# Patient Record
Sex: FEMALE | Race: WHITE | Employment: FULL TIME | URBAN - METROPOLITAN AREA
[De-identification: names, ages, dates, MRNs, and addresses within clinical notes are randomized per-mention and may not be internally consistent; named-entity substitution may affect disease eponyms.]

---

## 2020-05-26 ENCOUNTER — OFFICE VISIT (OUTPATIENT)
Dept: OBGYN CLINIC | Age: 25
End: 2020-05-26

## 2020-05-26 VITALS
WEIGHT: 122 LBS | DIASTOLIC BLOOD PRESSURE: 68 MMHG | BODY MASS INDEX: 19.61 KG/M2 | HEIGHT: 66 IN | SYSTOLIC BLOOD PRESSURE: 116 MMHG

## 2020-05-26 DIAGNOSIS — Z34.80 SUPERVISION OF OTHER NORMAL PREGNANCY, ANTEPARTUM: Primary | ICD-10-CM

## 2020-05-26 LAB
ANTIBODY SCREEN, EXTERNAL: NEGATIVE
CHLAMYDIA, EXTERNAL: NEGATIVE
HBSAG, EXTERNAL: NEGATIVE
HCT, EXTERNAL: 37.2
HGB, EXTERNAL: 12.4
HIV, EXTERNAL: NEGATIVE
N. GONORRHEA, EXTERNAL: NEGATIVE
PLATELET CNT,   EXTERNAL: 251
RUBELLA, EXTERNAL: NORMAL
T. PALLIDUM, EXTERNAL: NEGATIVE
TYPE, ABO & RH, EXTERNAL: NORMAL
VARICELLA, EXTERNAL: NORMAL

## 2020-05-26 NOTE — PROGRESS NOTES
Current pregnancy history:    Varun Edwards is a 26 y.o. female who presents for the evaluation of pregnancy. Patient's last menstrual period was 03/17/2020 (exact date). LMP history:  The date of her LMP is  certain. Her last menstrual period was normal and lasted for 4 to 5 days. A urine pregnancy test was positive 4/23/20 and 5/26/20. She was not on the pill at conception. Based on her LMP, her EDC is 12/22/20 and her EGA is 10 weeks,0 days. Her menstrual cycles are regular and occur approximately every 28 days  and range from 3 to 5 days. The last menses lasted  the usual number of days. Ultrasound data:  She had an  ultrasound done by the ultrasound tech [facility] which revealed a viable dunn pregnancy with a gestational age of 9 weeks and 0 days giving an [facility] of 12/22/20. TA ULTRASOUND PERFORMED  A SINGLE VIABLE 10W0D IUP IS SEEN WITH NORMAL CARDIAC RHYTHM. GESTATIONAL AGE BASED ON [facility] ULTRASOUND. A NORMAL YOLK SAC IS SEEN. RIGHT OVARY APPEARS WITHIN NORMAL LIMITS. LEFT OVARY APPEARS WITHIN NORMAL LIMITS. NO FREE FLUID SEEN IN THE CDS. Pregnancy symptoms:    Since her LMP she has experienced  urinary frequency, breast tenderness, and nausea. She has not been vomiting over the last few weeks. Associated signs and symptoms which she denies: dysuria, discharge, vaginal bleeding. She states she has gained weight:  Approximately 5 pounds over the last few weeks. Relevant past pregnancy history:   She has the following pregnancy history:    SAB X2    Relevant past medical history:(relevant to this pregnancy): noncontributory. Pap/Occupational history:  Last pap smear: 8/2019, per patient Results: Normal, per patient     Her occupation is: Teacher, [address] Substance history: negative for alcohol, tobacco and street drugs. Positive for nothing. Exposure history: There are no indoor cat/s in the home.    She admits close contact with children on a regular basis. She has had Varicella vaccine in the past.   Patient denies issues with domestic violence. Genetic Screening/Teratology Counseling: (Includes patient, baby's father, or anyone in either family with:)  3.  Patient's age >/= 28 at [facility]?-- no  .   2. Thalassemia ([facility], [place], [address], or  background): MCV<80?--no.     3.  Neural tube defect (meningomyelocele, spina bifida, anencephaly)?--no.   4.  Congenital heart defect?--no.  5.  Down syndrome?--no.   6.  Adrian-Sachs (Zoroastrianism, [place])?--no.   7.  Canavan's Disease?--no.   8.  Familial Dysautonomia?--no.   9.  Sickle cell disease or trait ()? --no   The patient has not been tested for sickle trait  10. Hemophilia or other blood disorders?--no. 11.  Muscular dystrophy?--no. 12.  Cystic fibrosis?--no. 13.  Cascade's Chorea?--no. 14.  Mental retardation/autism (if yes was person tested for Fragile X)?--no. 15.  Other inherited genetic or chromosomal disorder?--no. 12.  Maternal metabolic disorder (DM, PKU, etc)?--no. 17.  Patient or FOB with a child with a birth defect not listed above?--no.  17a. Patient or FOB with a birth defect themselves?--no. 18.  Recurrent pregnancy loss, or stillbirth?--no. 19.  Any medications since LMP other than prenatal vitamins (include vitamins,  supplements, OTC meds, drugs, alcohol)?--no. 20.  Any other genetic/environmental exposure to discuss?--no. Infection History:  1. Lives with someone with TB or TB exposed?--no.   2.  Patient or partner has history of genital herpes?--no.  3.  Rash or viral illness since LMP?--no.    4.  History of STD (GC, CT, HPV, syphilis, HIV)? --no   5.  Other: OTHER?      OB History    Para Term  AB Living   3 0 0 0 2 0   SAB TAB Ectopic Molar Multiple Live Births   2 0 0 0 0 0      # Outcome Date GA Lbr Salbador/2nd Weight Sex Delivery Anes PTL Lv   3 Current            2 2020           1 2020 Past Medical History:   Diagnosis Date    Migraines      History reviewed. No pertinent surgical history. Social History     Occupational History    Not on file   Tobacco Use    Smoking status: Never Smoker    Smokeless tobacco: Never Used   Substance and Sexual Activity    Alcohol use: Not on file    Drug use: Not on file    Sexual activity: Yes     Partners: Male     No family history on file.     Allergies not on file  Prior to Admission medications    Not on File        Review of Systems: History obtained from the patient  Constitutional: negative for weight loss, fever, night sweats  HEENT: negative for hearing loss, earache, congestion, snoring, sorethroat  CV: negative for chest pain, palpitations, edema  Resp: negative for cough, shortness of breath, wheezing  Breast: negative for breast lumps, nipple discharge, galactorrhea  GI: negative for change in bowel habits, abdominal pain, black or bloody stools  : negative for frequency, dysuria, hematuria, vaginal discharge  MSK: negative for back pain, joint pain, muscle pain  Skin: negative for itching, rash, hives  Neuro: negative for dizziness, headache, confusion, weakness  Psych: negative for anxiety, depression, change in mood  Heme/lymph: negative for bleeding, bruising, pallor    Objective:  Visit Vitals  /68   Ht 5' 5.5\" (1.664 m)   Wt 122 lb (55.3 kg)   LMP 03/17/2020 (Exact Date)   BMI 19.99 kg/m²       Physical Exam:   PHYSICAL EXAMINATION    Constitutional  · Appearance: well-nourished, well developed, alert, in no acute distress    HENT  · Head  · Face: appears normal  · Eyes: appear normal  · Ears: normal  · Mouth: normal  · Lips: no lesions    Neck  · Inspection/Palpation: normal appearance, no masses or tenderness  · Lymph Nodes: no lymphadenopathy present  · Thyroid: gland size normal, nontender, no nodules or masses present on palpation    Chest  · Respiratory Effort: breathing unlabored  · Auscultation: normal breath sounds    Cardiovascular  · Heart:  · Auscultation: regular rate and rhythm without murmur    Breasts  · Inspection of Breasts: breasts symmetrical, no skin changes, no discharge present, nipple appearance normal, no skin retraction present  · Palpation of Breasts and Axillae: no masses present on palpation, no breast tenderness  · Axillary Lymph Nodes: no lymphadenopathy present    Gastrointestinal  · Abdominal Examination: abdomen non-tender to palpation, normal bowel sounds, no masses present  · Liver and spleen: no hepatomegaly present, spleen not palpable  · Hernias: no hernias identified    Genitourinary  · External Genitalia: normal appearance for age, no discharge present, no tenderness present, no inflammatory lesions present, no masses present, no atrophy present  · Vagina: normal vaginal vault without central or paravaginal defects, no discharge present, no inflammatory lesions present, no masses present  · Bladder: non-tender to palpation  · Urethra: appears normal  · Cervix: normal   · Uterus: enlarged, normal shape, soft  · Adnexa: no adnexal tenderness present, no adnexal masses present  · Perineum: perineum within normal limits, no evidence of trauma, no rashes or skin lesions present  · Anus: anus within normal limits, no hemorrhoids present  · Inguinal Lymph Nodes: no lymphadenopathy present    Skin  · General Inspection: no rash, no lesions identified    Neurologic/Psychiatric  · Mental Status:  · Orientation: grossly oriented to person, place and time  · Mood and Affect: mood normal, affect appropriate    Assessment:   Intrauterine pregnancy with the following problems identified:   EDC 12/22/2020 by D=  NIPTS  Horizon  Parvo        Plan:     Offered CF testing, CVS, Nuchal Translucency, MSAFP, amnio, and discussed NIPT  Course of pregnancy discussed including visit schedule, routine U/S, glucola testing, etc.  Avoid alcoholic beverages and illicit/recreational drugs use  Take prenatal vitamins or folic acid daily. Hospital and practice style discussed with coverage system. Discussed nutrition, toxoplasmosis precautions, sexual activity, exercise, need for influenza vaccine, environmental and work hazards, travel advice, screen for domestic violence, need for seat belts. Discussed seafood, unpasteurized dairy products, deli meat, artificial sweeteners, and caffeine. Information on prenatal classes/breastfeeding given. Information on circumcision given  Patient encouraged not to smoke. Discussed current prescription drug use. Given medication list.  Discussed the use of over the counter medications and chemicals. Route of delivery discussed, including risks, benefits, and alternatives of  versus repeat LTCS. Pt understands risk of hemorrhage during pregnancy and post delivery and would accept blood products if necessary in life-threatening emergencies  Fu 2 weeks    Handouts given to pt.

## 2020-05-27 ENCOUNTER — TELEPHONE (OUTPATIENT)
Dept: OBGYN CLINIC | Age: 25
End: 2020-05-27

## 2020-05-27 NOTE — TELEPHONE ENCOUNTER
Call received at 635am    26year old patient last seen in the office yesterday for pregnancy. Patient denies vaginal bleeding and cramping. Patient calling to say that is is ok to sent the blood work for the NIPTS testing.     Thank you

## 2020-05-28 DIAGNOSIS — Z34.81 ENCOUNTER FOR SUPERVISION OF OTHER NORMAL PREGNANCY, FIRST TRIMESTER: ICD-10-CM

## 2020-05-28 LAB
ABO GROUP BLD: NORMAL
B19V IGG SER IA-ACNC: 6.1 INDEX (ref 0–0.8)
B19V IGM SER IA-ACNC: 0 INDEX (ref 0–0.8)
BLD GP AB SCN SERPL QL: NEGATIVE
C TRACH RRNA SPEC QL NAA+PROBE: NEGATIVE
CYTOLOGIST CVX/VAG CYTO: NORMAL
CYTOLOGY CVX/VAG DOC CYTO: NORMAL
CYTOLOGY CVX/VAG DOC THIN PREP: NORMAL
DX ICD CODE: NORMAL
ERYTHROCYTE [DISTWIDTH] IN BLOOD BY AUTOMATED COUNT: 12.5 % (ref 11.7–15.4)
HBV SURFACE AG SERPL QL IA: NEGATIVE
HCT VFR BLD AUTO: 37.2 % (ref 34–46.6)
HGB BLD-MCNC: 12.4 G/DL (ref 11.1–15.9)
HIV 1+2 AB+HIV1 P24 AG SERPL QL IA: NON REACTIVE
LABCORP, 190119: NORMAL
Lab: NORMAL
MCH RBC QN AUTO: 29.9 PG (ref 26.6–33)
MCHC RBC AUTO-ENTMCNC: 33.3 G/DL (ref 31.5–35.7)
MCV RBC AUTO: 90 FL (ref 79–97)
N GONORRHOEA RRNA SPEC QL NAA+PROBE: NEGATIVE
OTHER STN SPEC: NORMAL
PLATELET # BLD AUTO: 251 X10E3/UL (ref 150–450)
RBC # BLD AUTO: 4.15 X10E6/UL (ref 3.77–5.28)
RH BLD: POSITIVE
RUBV IGG SERPL IA-ACNC: 1.1 INDEX
STAT OF ADQ CVX/VAG CYTO-IMP: NORMAL
T VAGINALIS DNA SPEC QL NAA+PROBE: NEGATIVE
TREPONEMA PALLIDUM IGG+IGM AB [PRESENCE] IN SERUM OR PLASMA BY IMMUNOASSAY: NON REACTIVE
VZV IGG SER IA-ACNC: 1325 INDEX
WBC # BLD AUTO: 6 X10E3/UL (ref 3.4–10.8)

## 2020-05-29 LAB — BACTERIA UR CULT: NORMAL

## 2020-06-09 ENCOUNTER — ROUTINE PRENATAL (OUTPATIENT)
Dept: OBGYN CLINIC | Age: 25
End: 2020-06-09

## 2020-06-09 VITALS
SYSTOLIC BLOOD PRESSURE: 112 MMHG | DIASTOLIC BLOOD PRESSURE: 59 MMHG | HEIGHT: 66 IN | WEIGHT: 119 LBS | BODY MASS INDEX: 19.13 KG/M2

## 2020-06-09 DIAGNOSIS — Z34.81 ENCOUNTER FOR SUPERVISION OF OTHER NORMAL PREGNANCY, FIRST TRIMESTER: ICD-10-CM

## 2020-06-09 NOTE — PATIENT INSTRUCTIONS
[facility] Help Desk: [phone]       Weeks 10 to 14 of Your Pregnancy: Care Instructions  Your Care Instructions    By weeks 10 to 14 of your pregnancy, the placenta has formed inside your uterus. It is possible to hear your baby's heartbeat with a special ultrasound device. Your baby's eyes can and do move. The arms and legs can bend. This is a good time to think about testing for birth defects. There are two types of tests: screening and diagnostic. Screening tests show the chance that a baby has a certain birth defect. They can't tell you for sure that your baby has a problem. Diagnostic tests show if a baby has a certain birth defect. It's your choice whether to have these tests. You and your partner can talk to your doctor or midwife about birth defects tests. Follow-up care is a key part of your treatment and safety. Be sure to make and go to all appointments, and call your doctor if you are having problems. It's also a good idea to know your test results and keep a list of the medicines you take. How can you care for yourself at home? Decide about tests  · You can have screening tests and diagnostic tests to check for birth defects. The decision to have a test for birth defects is personal. Think about your age, your chance of passing on a family disease, your need to know about any problems, and what you might do after you have the test results. ? Triple or quadruple (quad) blood tests. These screening tests can be done between 15 and 20 weeks of pregnancy. They check the amounts of three or four substances in your blood. The doctor looks at these test results, along with your age and other factors, to find out the chance that your baby may have certain problems. ? Amniocentesis. This diagnostic test is used to look for chromosomal problems in the baby's cells.  It can be done between 15 and 20 weeks of pregnancy, usually around week 16.  ? Nuchal translucency test. This test uses ultrasound to measure the thickness of the area at the back of the baby's neck. An increase in the thickness can be an early sign of Down syndrome. ? Chorionic villus sampling (CVS). This is a test that looks for certain genetic problems with your baby. The same genes that are in your baby are in the placenta. A small piece of the placenta is taken out and tested. This test is done when you are 10 to 13 weeks pregnant. Ease discomfort  · Slow down and take naps when you feel tired. · If your emotions swing, talk to someone. Crying, anxiety, and concentration problems are common. · If your gums bleed, try a softer toothbrush. If your gums are puffy and bleed a lot, see your dentist.  · If you feel dizzy:  ? Get up slowly after sitting or lying down. ? Drink plenty of fluids. ? Eat small snacks to keep your blood sugar stable. ? Put your head between your legs as though you were tying your shoelaces. ? Lie down with your legs higher than your head. Use pillows to prop up your feet. · If you have a headache:  ? Lie down. ? Ask your partner or a good friend for a neck massage. ? Try cool cloths over your forehead or across the back of your neck. ? Use acetaminophen (Tylenol) for pain relief. Do not use nonsteroidal anti-inflammatory drugs (NSAIDs), such as ibuprofen (Advil, Motrin) or naproxen (Aleve), unless your doctor says it is okay. · If you have a nosebleed, pinch your nose gently, and hold it for a short while. To prevent nosebleeds, try massaging a small dab of petroleum jelly, such as Vaseline, in your nostrils. · If your nose is stuffed up, try saline (saltwater) nose sprays. Do not use decongestant sprays. Care for your breasts  · Wear a bra that gives you good support. · Know that changes in your breasts are normal.  ? Your breasts may get larger and more tender. Tenderness usually gets better by 12 weeks. ? Your nipples may get darker and larger, and small bumps around your nipples may show more. ?  The veins in your chest and breasts may show more. · Don't worry about \"toughening'\" your nipples. Breastfeeding will naturally do this. Where can you learn more? Go to http://dyllan-unruly.info/  Enter Q995 in the search box to learn more about \"Weeks 10 to 14 of Your Pregnancy: Care Instructions. \"  Current as of: May 29, 2019Content Version: 12.4  © 0827-4416 Healthwise, Incorporated. Care instructions adapted under license by [employer] (which disclaims liability or warranty for this information). If you have questions about a medical condition or this instruction, always ask your healthcare professional. [address] any warranty or liability for your use of this information.

## 2020-06-09 NOTE — PROGRESS NOTES
Problem List  Date Reviewed: 6/9/2020          Codes Class Noted    Encounter for supervision of other normal pregnancy, first trimester ICD-10-CM: Z34.81  ICD-9-CM: V22.1  5/28/2020    Overview Addendum 6/9/2020  9:54 AM by Clay Patel LPN     [facility] 45/40/1790 by D=US  NIPTS- normal male  Horizon- negative  Varicella and parvo immune                  Doing well, taking vits  Fu with AFP

## 2020-07-02 ENCOUNTER — TELEPHONE (OUTPATIENT)
Dept: OBGYN CLINIC | Age: 25
End: 2020-07-02

## 2020-07-02 NOTE — TELEPHONE ENCOUNTER
Call received at 8:45am\        26year old patient  15w2d pregnant last seen in the office on 2020. Patient calling calling to say that starting on 2020 patient has been having urinary frequency ( patient reports she went 20 times yesterday) patient reports burning upon urination. Patient denies fever, back pain or blood in urine. Patient wanted to take azo and wait to be seen till her next appointment on 2020. Patient advised to seek care at her PCP who is closer. Patient is one hour from the office. FYI        Patient advised to call back with any further questions.

## 2020-07-07 ENCOUNTER — ROUTINE PRENATAL (OUTPATIENT)
Dept: OBGYN CLINIC | Age: 25
End: 2020-07-07

## 2020-07-07 VITALS
SYSTOLIC BLOOD PRESSURE: 107 MMHG | HEIGHT: 65 IN | BODY MASS INDEX: 19.99 KG/M2 | WEIGHT: 120 LBS | DIASTOLIC BLOOD PRESSURE: 61 MMHG

## 2020-07-07 DIAGNOSIS — Z34.80 ENCOUNTER FOR SUPERVISION OF OTHER NORMAL PREGNANCY, UNSPECIFIED TRIMESTER: Primary | ICD-10-CM

## 2020-07-07 LAB
AFP, MATERNAL, EXTERNAL: NEGATIVE
URINALYSIS, EXTERNAL: NEGATIVE

## 2020-07-07 NOTE — PROGRESS NOTES
Doing well  AFO today  Repeat urine cx today  US in 4 weeks Advancement Flap (Single) Text: The defect edges were debeveled with a #15 scalpel blade.  Given the location of the defect and the proximity to free margins a single advancement flap was deemed most appropriate.  Using a sterile surgical marker, an appropriate advancement flap was drawn incorporating the defect and placing the expected incisions within the relaxed skin tension lines where possible.    The area thus outlined was incised deep to adipose tissue with a #15 scalpel blade.  The skin margins were undermined to an appropriate distance in all directions utilizing iris scissors.

## 2020-07-07 NOTE — PROGRESS NOTES
Patient saw PCP for UTI. States she completed abx. PCP did advise her +UTI. Urine culture sent today.

## 2020-07-07 NOTE — PROGRESS NOTES
Problem List  Date Reviewed: 6/9/2020          Codes Class Noted    Encounter for supervision of other normal pregnancy, first trimester ICD-10-CM: Z34.81  ICD-9-CM: V22.1  5/28/2020    Overview Addendum 6/9/2020  9:54 AM by LIANE Haney 39 [id number]/[id number] by D=US  NIPTS- normal male  Horizon- negative  Varicella and parvo immune

## 2020-07-07 NOTE — PATIENT INSTRUCTIONS

## 2020-07-10 ENCOUNTER — TELEPHONE (OUTPATIENT)
Dept: OBGYN CLINIC | Age: 25
End: 2020-07-10

## 2020-07-10 LAB
AFP ADJ MOM SERPL: 1.21
AFP INTERP SERPL-IMP: NORMAL
AFP INTERP SERPL-IMP: NORMAL
AFP SERPL-MCNC: 45.6 NG/ML
AGE AT DELIVERY: 24.8 YR
BACTERIA UR CULT: NORMAL
COMMENT, 018013: NORMAL
GA METHOD: NORMAL
GA: 16 WEEKS
IDDM PATIENT QL: NO
MULTIPLE PREGNANCY: NO
NEURAL TUBE DEFECT RISK FETUS: 6301 %
RESULTS, 017004: NORMAL

## 2020-07-10 NOTE — TELEPHONE ENCOUNTER
3:13 pm - Friday 7/10/20-   Patient of TH    16 w3d    Calling to say that she went to her PCP for a UTI recently and gave a urine sample. Her PCP put her on Cephalexin and called and told her that the bacteria report did not indicate anything definate. Her PCP told her that she could d/c the rx if better. At that time she felt better after taking 2 days worth. She then felt uti symptoms again after being off of the medication for a short time and finished up the remaining days to equal 5 days on antibiotics. She is done with Cephalexin and back to burning with urination before and after and lower back pain. No fever.

## 2020-07-10 NOTE — TELEPHONE ENCOUNTER
Spoke with Philip Jacobsen and she said that she needs to see if pcp can follow up with her and let her give them a urine sample, she is over an hour away from here. If not, urgent care if worsens. Call us on Monday if not improved and never could be seen after all. Patient is good with this plan.

## 2020-07-13 ENCOUNTER — NURSE TRIAGE (OUTPATIENT)
Dept: OBGYN CLINIC | Age: 25
End: 2020-07-13

## 2020-07-13 NOTE — TELEPHONE ENCOUNTER
Call received at 542am    26year old patient last seen in the office on 2020  12 w6d pregnant . Patient calling back to let us know that her went to her PCP and the uti has cleared up but she had BV and was given a prescription for metronidazole for 7 days. Patient states she is feeling better and denies vaginal bleeding and cramping.     Patient is reporting nose bleeds      FYI

## 2020-07-13 NOTE — TELEPHONE ENCOUNTER
Lawyer Jarocho MD  You 3 minutes ago (1:08 PM)       Nosebleeds are normal. Use nasal salilne    Message text       Lawyer Jarocho MD  You 3 minutes ago (1:07 PM)       okay    Message text

## 2020-07-14 DIAGNOSIS — Z34.81 ENCOUNTER FOR SUPERVISION OF OTHER NORMAL PREGNANCY, FIRST TRIMESTER: ICD-10-CM

## 2020-07-31 ENCOUNTER — ROUTINE PRENATAL (OUTPATIENT)
Dept: OBGYN CLINIC | Age: 25
End: 2020-07-31

## 2020-07-31 VITALS
SYSTOLIC BLOOD PRESSURE: 105 MMHG | HEIGHT: 65 IN | DIASTOLIC BLOOD PRESSURE: 57 MMHG | WEIGHT: 124 LBS | BODY MASS INDEX: 20.66 KG/M2

## 2020-07-31 DIAGNOSIS — Z34.80 ENCOUNTER FOR SUPERVISION OF OTHER NORMAL PREGNANCY, UNSPECIFIED TRIMESTER: Primary | ICD-10-CM

## 2020-07-31 NOTE — PROGRESS NOTES
Problem List  Date Reviewed: 7/7/2020          Codes Class Noted    Encounter for supervision of other normal pregnancy, first trimester ICD-10-CM: Z34.81  ICD-9-CM: V22.1  5/28/2020    Overview Addendum 7/14/2020  1:20 PM by LIANE Guerrero 39 92/47/6789 by D=US  NIPTS- normal male  Horizon- negative  Varicella and parvo immune   UTI PCP 6/2020- repeat cx 7/7  AFP neg                   FETAL SURVEY  A SINGLE VIABLE IUP AT 19W3D GA BY LMP. FETAL CARDIAC MOTION OBSERVED. FETAL ANATOMY WELL VISUALIZED AND APPEARS WITHIN NORMAL LIMITS. NO ABNORMALITIES IDENTIFIED ON TODAYS EXAM.  APPROPRIATE GROWTH MEASURED; SIZE = DATES. GABRIEL, CERVIX AND PLACENTA APPEAR WITHIN NORMAL LIMITS.   GENDER: XY

## 2020-07-31 NOTE — PATIENT INSTRUCTIONS

## 2020-08-28 ENCOUNTER — ROUTINE PRENATAL (OUTPATIENT)
Dept: OBGYN CLINIC | Age: 25
End: 2020-08-28
Payer: COMMERCIAL

## 2020-08-28 VITALS
DIASTOLIC BLOOD PRESSURE: 84 MMHG | WEIGHT: 130.4 LBS | HEIGHT: 65 IN | BODY MASS INDEX: 21.73 KG/M2 | SYSTOLIC BLOOD PRESSURE: 117 MMHG

## 2020-08-28 DIAGNOSIS — Z34.80 ENCOUNTER FOR SUPERVISION OF OTHER NORMAL PREGNANCY, UNSPECIFIED TRIMESTER: Primary | ICD-10-CM

## 2020-08-28 PROCEDURE — 0502F SUBSEQUENT PRENATAL CARE: CPT | Performed by: OBSTETRICS & GYNECOLOGY

## 2020-08-28 NOTE — PATIENT INSTRUCTIONS
Weeks 22 to 26 of Your Pregnancy: Care Instructions Your Care Instructions As you enter your 7th month of pregnancy at week 26, your baby's lungs are growing stronger and getting ready to breathe. You may notice that your baby responds to the sound of your or your partner's voice. You may also notice that your baby does less turning and twisting and more squirming or jerking. Jerking often means that your baby has the hiccups. Hiccups are perfectly normal and are only temporary. You may want to think about attending a childbirth preparation class. This is also a good time to start thinking about whether you want to have pain medicine during labor. Most pregnant women are tested for gestational diabetes between weeks 25 and 28. Gestational diabetes occurs when your blood sugar level gets too high when you're pregnant. The test is important, because you can have gestational diabetes and not know it. But the condition can cause problems for your baby. Follow-up care is a key part of your treatment and safety. Be sure to make and go to all appointments, and call your doctor if you are having problems. It's also a good idea to know your test results and keep a list of the medicines you take. How can you care for yourself at home? Ease discomfort from your baby's kicking · Change your position. Sometimes this will cause your baby to change position too. · Take a deep breath while you raise your arm over your head. Then breathe out while you drop your arm. Do Kegel exercises to prevent urine from leaking · You can do Kegel exercises while you stand or sit. ? Squeeze the same muscles you would use to stop your urine. Your belly and thighs should not move. ? Hold the squeeze for 3 seconds, and then relax for 3 seconds. ? Start with 3 seconds. Then add 1 second each week until you are able to squeeze for 10 seconds. ? Repeat the exercise 10 to 15 times for each session.  Do three or more sessions each day. Ease or reduce swelling in your feet, ankles, hands, and fingers · If your fingers are puffy, take off your rings. · Do not eat high-salt foods, such as potato chips. · Prop up your feet on a stool or couch as much as possible. Sleep with pillows under your feet. · Do not stand for long periods of time or wear tight shoes. · Wear support stockings. Where can you learn more? Go to http://dyllan-unruly.info/ Enter G264 in the search box to learn more about \"Weeks 22 to 26 of Your Pregnancy: Care Instructions. \" Current as of: May 29, 2019Content Version: 12.4 © 0659-8369 Healthwise, Incorporated. Care instructions adapted under license by [employer] (which disclaims liability or warranty for this information). If you have questions about a medical condition or this instruction, always ask your healthcare professional. [address] any warranty or liability for your use of this information.

## 2020-08-28 NOTE — PROGRESS NOTES
Problem List  Date Reviewed: 7/31/2020          Codes Class Noted    Encounter for supervision of other normal pregnancy, first trimester ICD-10-CM: Z34.81  ICD-9-CM: V22.1  5/28/2020    Overview Addendum 7/14/2020  1:20 PM by Rosa Cormier LPN     [facility]74/5209 [facility] D=US  NIPTS- normal male  Horizon- negative  Varicella and parvo immune   UTI PCP 6/2020- repeat cx 7/7  AFP neg

## 2020-09-28 ENCOUNTER — ROUTINE PRENATAL (OUTPATIENT)
Dept: OBGYN CLINIC | Age: 25
End: 2020-09-28
Payer: COMMERCIAL

## 2020-09-28 VITALS
HEIGHT: 65 IN | DIASTOLIC BLOOD PRESSURE: 64 MMHG | BODY MASS INDEX: 22.79 KG/M2 | SYSTOLIC BLOOD PRESSURE: 116 MMHG | WEIGHT: 136.8 LBS

## 2020-09-28 DIAGNOSIS — Z34.80 ENCOUNTER FOR SUPERVISION OF OTHER NORMAL PREGNANCY, UNSPECIFIED TRIMESTER: Primary | ICD-10-CM

## 2020-09-28 PROCEDURE — 0502F SUBSEQUENT PRENATAL CARE: CPT | Performed by: OBSTETRICS & GYNECOLOGY

## 2020-09-28 PROCEDURE — 96372 THER/PROPH/DIAG INJ SC/IM: CPT | Performed by: OBSTETRICS & GYNECOLOGY

## 2020-09-28 NOTE — PROGRESS NOTES
Problem List  Date Reviewed: 8/28/2020          Codes Class Noted    Encounter for supervision of other normal pregnancy, first trimester ICD-10-CM: Z34.81  ICD-9-CM: V22.1  5/28/2020    Overview Addendum 7/14/2020  1:20 PM by Uriel Garcia LPN     [facility] 63/57/3077 by D=US  NIPTS- normal male  Horizon- negative  Varicella and parvo immune   UTI PCP 6/2020- repeat cx 7/7  AFP neg

## 2020-09-28 NOTE — PATIENT INSTRUCTIONS

## 2020-09-29 LAB
ERYTHROCYTE [DISTWIDTH] IN BLOOD BY AUTOMATED COUNT: 12.3 % (ref 11.7–15.4)
GLUCOSE 1H P 50 G GLC PO SERPL-MCNC: 140 MG/DL (ref 65–139)
HCT VFR BLD AUTO: 29.5 % (ref 34–46.6)
HGB BLD-MCNC: 9.9 G/DL (ref 11.1–15.9)
MCH RBC QN AUTO: 30.7 PG (ref 26.6–33)
MCHC RBC AUTO-ENTMCNC: 33.6 G/DL (ref 31.5–35.7)
MCV RBC AUTO: 91 FL (ref 79–97)
PLATELET # BLD AUTO: 222 X10E3/UL (ref 150–450)
RBC # BLD AUTO: 3.23 X10E6/UL (ref 3.77–5.28)
WBC # BLD AUTO: 8.5 X10E3/UL (ref 3.4–10.8)

## 2020-09-30 PROCEDURE — 90715 TDAP VACCINE 7 YRS/> IM: CPT | Performed by: OBSTETRICS & GYNECOLOGY

## 2020-10-02 ENCOUNTER — LAB ONLY (OUTPATIENT)
Dept: OBGYN CLINIC | Age: 25
End: 2020-10-02

## 2020-10-02 DIAGNOSIS — R73.09 ABNORMAL GLUCOSE: Primary | ICD-10-CM

## 2020-10-03 LAB
GLUCOSE 1H P 100 G GLC PO SERPL-MCNC: 143 MG/DL (ref 65–179)
GLUCOSE 2H P 100 G GLC PO SERPL-MCNC: 121 MG/DL (ref 65–154)
GLUCOSE 3H P 100 G GLC PO SERPL-MCNC: 93 MG/DL (ref 65–139)
GLUCOSE P FAST SERPL-MCNC: 78 MG/DL (ref 65–94)
NOTE:, 102047: NORMAL

## 2020-10-09 ENCOUNTER — ROUTINE PRENATAL (OUTPATIENT)
Dept: OBGYN CLINIC | Age: 25
End: 2020-10-09
Payer: COMMERCIAL

## 2020-10-09 VITALS — WEIGHT: 138 LBS | SYSTOLIC BLOOD PRESSURE: 112 MMHG | DIASTOLIC BLOOD PRESSURE: 64 MMHG | BODY MASS INDEX: 22.96 KG/M2

## 2020-10-09 DIAGNOSIS — Z34.80 ENCOUNTER FOR SUPERVISION OF OTHER NORMAL PREGNANCY, UNSPECIFIED TRIMESTER: Primary | ICD-10-CM

## 2020-10-09 PROCEDURE — 90471 IMMUNIZATION ADMIN: CPT

## 2020-10-09 PROCEDURE — 0502F SUBSEQUENT PRENATAL CARE: CPT | Performed by: OBSTETRICS & GYNECOLOGY

## 2020-10-09 PROCEDURE — 90686 IIV4 VACC NO PRSV 0.5 ML IM: CPT

## 2020-10-09 PROCEDURE — 0502F SUBSEQUENT PRENATAL CARE: CPT

## 2020-10-09 NOTE — PROGRESS NOTES
Problem List  Date Reviewed: 9/28/2020          Codes Class Noted    Encounter for supervision of other normal pregnancy, first trimester ICD-10-CM: Z34.81  ICD-9-CM: V22.1  5/28/2020    Overview Addendum 10/6/2020  8:13 AM by Eliseo Diego LPN     [facility] 74/85/1399 by D=US  NIPTS- normal male  Horizon- negative  Varicella and parvo immune   UTI PCP 6/2020- repeat cx 7/7  AFP neg  Failed 1hr- 140 3hr, wnl  Anemia 9.9 @ 28wks

## 2020-10-14 ENCOUNTER — ROUTINE PRENATAL (OUTPATIENT)
Dept: OBGYN CLINIC | Age: 25
End: 2020-10-14
Payer: COMMERCIAL

## 2020-10-14 VITALS
BODY MASS INDEX: 23.22 KG/M2 | DIASTOLIC BLOOD PRESSURE: 82 MMHG | WEIGHT: 139.4 LBS | HEIGHT: 65 IN | SYSTOLIC BLOOD PRESSURE: 147 MMHG

## 2020-10-14 DIAGNOSIS — O36.8190 DECREASED FETAL MOVEMENT DURING PREGNANCY, ANTEPARTUM, SINGLE OR UNSPECIFIED FETUS: Primary | ICD-10-CM

## 2020-10-14 PROCEDURE — MISCGLOBALOB GLOBAL OB: Performed by: OBSTETRICS & GYNECOLOGY

## 2020-10-14 PROCEDURE — 0502F SUBSEQUENT PRENATAL CARE: CPT | Performed by: OBSTETRICS & GYNECOLOGY

## 2020-10-14 NOTE — PROGRESS NOTES
Problem List  Date Reviewed: 10/9/2020          Codes Class Noted    Encounter for supervision of other normal pregnancy, first trimester ICD-10-CM: Z34.81  ICD-9-CM: V22.1  5/28/2020    Overview Addendum 10/9/2020  3:37 PM by Elyse Vincent, LIANE     [facility] 06/36/7095 by D=US  NIPTS- normal male  Horizon- negative  Varicella and parvo immune   UTI PCP 6/2020- repeat cx 7/7  AFP neg  Failed 1hr- 140 3hr, wnl  Anemia 9.9 @ 28wks  Flu vaccine 10/9

## 2020-10-14 NOTE — PATIENT INSTRUCTIONS

## 2020-10-22 ENCOUNTER — ROUTINE PRENATAL (OUTPATIENT)
Dept: OBGYN CLINIC | Age: 25
End: 2020-10-22
Payer: COMMERCIAL

## 2020-10-22 VITALS
WEIGHT: 141 LBS | SYSTOLIC BLOOD PRESSURE: 129 MMHG | DIASTOLIC BLOOD PRESSURE: 64 MMHG | BODY MASS INDEX: 23.49 KG/M2 | HEIGHT: 65 IN

## 2020-10-22 DIAGNOSIS — Z34.80 ENCOUNTER FOR SUPERVISION OF OTHER NORMAL PREGNANCY, UNSPECIFIED TRIMESTER: Primary | ICD-10-CM

## 2020-10-22 PROCEDURE — 0502F SUBSEQUENT PRENATAL CARE: CPT | Performed by: OBSTETRICS & GYNECOLOGY

## 2020-10-22 NOTE — PATIENT INSTRUCTIONS
Weeks 32 to 34 of Your Pregnancy: Care Instructions Your Care Instructions During the last few weeks of your pregnancy, you may have more aches and pains. It's important to rest when you can. Your growing baby is putting more pressure on your bladder. So you may need to urinate more often. Hemorrhoids are also common. These are painful, itchy veins in the rectal area. In the 36th week, most women have a test for group B streptococcus (GBS). GBS is a common bacteria that can live in the vagina and rectum. It can make your baby sick after birth. If you test positive, you will get antibiotics during labor. These will keep your baby from getting the bacteria. You may want to talk with your doctor about banking your baby's umbilical cord blood. This is the blood left in the cord after birth. If you want to save this blood, you must arrange it ahead of time. You can't decide at the last minute. If you haven't already had the Tdap shot during this pregnancy, talk to your doctor about getting it. It will help protect your  against pertussis infection. Follow-up care is a key part of your treatment and safety. Be sure to make and go to all appointments, and call your doctor if you are having problems. It's also a good idea to know your test results and keep a list of the medicines you take. How can you care for yourself at home? Ease hemorrhoids · Get more liquids, fruits, vegetables, and fiber in your diet. This will help keep your stools soft. · Avoid sitting for too long. Lie on your left side several times a day. · Clean yourself with soft, moist toilet paper. Or you can use witch hazel pads or personal hygiene pads. · If you are uncomfortable, try ice packs. Or you can sit in a warm sitz bath. Do these for 20 minutes at a time, as needed. · Use hydrocortisone cream for pain and itching. Two examples are Anusol and Preparation H Hydrocortisone. · Ask your doctor about taking an over-the-counter stool softener. Consider breastfeeding · Experts recommend that women breastfeed for 1 year or longer. · Breast milk may help protect your child from some health problems.  babies are less likely than formula-fed babies to: 
? Get ear infections, colds, diarrhea, and pneumonia. ? Be obese or get diabetes later in life. · Women who breastfeed have less bleeding after the birth. Their uteruses also shrink back faster. · Some women who breastfeed lose weight faster. Making milk burns calories. · Breastfeeding can lower your risk of breast cancer, ovarian cancer, and osteoporosis. Decide about circumcision for boys · As you make this decision, it may help to think about your personal, Gnosticist, and family traditions. You get to decide if you will keep your son's penis natural or if he will be circumcised. · If you decide that you would like to have your baby circumcised, talk with your doctor. You can share your concerns about pain. And you can discuss your preferences for anesthesia. Where can you learn more? Go to http://www.gray.com/ Enter Z636 in the search box to learn more about \"Weeks 32 to 34 of Your Pregnancy: Care Instructions. \" Current as of: February 11, 2020               Content Version: 12.6 © 1490-5576 [employer]. Care instructions adapted under license by [employer] (which disclaims liability or warranty for this information). If you have questions about a medical condition or this instruction, always ask your healthcare professional. [place] [address] any warranty or liability for your use of this information.

## 2020-10-22 NOTE — PROGRESS NOTES
Problem List  Date Reviewed: 10/14/2020          Codes Class Noted    Encounter for supervision of other normal pregnancy, first trimester ICD-10-CM: Z34.81  ICD-9-CM: V22.1  5/28/2020    Overview Addendum 10/9/2020  3:37 PM by Jenny Peralta LPN     [facility] 38/37/5355 by D=US  NIPTS- normal male  Horizon- negative  Varicella and parvo immune   UTI PCP 6/2020- repeat cx 7/7  AFP neg  Failed 1hr- 140 3hr, wnl  Anemia 9.9 @ 28wks  Flu vaccine 10/9

## 2020-11-05 ENCOUNTER — ROUTINE PRENATAL (OUTPATIENT)
Dept: OBGYN CLINIC | Age: 25
End: 2020-11-05
Payer: COMMERCIAL

## 2020-11-05 VITALS
BODY MASS INDEX: 23.96 KG/M2 | DIASTOLIC BLOOD PRESSURE: 69 MMHG | WEIGHT: 143.8 LBS | HEIGHT: 65 IN | SYSTOLIC BLOOD PRESSURE: 120 MMHG

## 2020-11-05 DIAGNOSIS — Z34.80 ENCOUNTER FOR SUPERVISION OF OTHER NORMAL PREGNANCY, UNSPECIFIED TRIMESTER: Primary | ICD-10-CM

## 2020-11-05 PROCEDURE — 0502F SUBSEQUENT PRENATAL CARE: CPT | Performed by: OBSTETRICS & GYNECOLOGY

## 2020-11-05 NOTE — PATIENT INSTRUCTIONS
Weeks 32 to 34 of Your Pregnancy: Care Instructions Your Care Instructions During the last few weeks of your pregnancy, you may have more aches and pains. It's important to rest when you can. Your growing baby is putting more pressure on your bladder. So you may need to urinate more often. Hemorrhoids are also common. These are painful, itchy veins in the rectal area. In the 36th week, most women have a test for group B streptococcus (GBS). GBS is a common bacteria that can live in the vagina and rectum. It can make your baby sick after birth. If you test positive, you will get antibiotics during labor. These will keep your baby from getting the bacteria. You may want to talk with your doctor about banking your baby's umbilical cord blood. This is the blood left in the cord after birth. If you want to save this blood, you must arrange it ahead of time. You can't decide at the last minute. If you haven't already had the Tdap shot during this pregnancy, talk to your doctor about getting it. It will help protect your  against pertussis infection. Follow-up care is a key part of your treatment and safety. Be sure to make and go to all appointments, and call your doctor if you are having problems. It's also a good idea to know your test results and keep a list of the medicines you take. How can you care for yourself at home? Ease hemorrhoids · Get more liquids, fruits, vegetables, and fiber in your diet. This will help keep your stools soft. · Avoid sitting for too long. Lie on your left side several times a day. · Clean yourself with soft, moist toilet paper. Or you can use witch hazel pads or personal hygiene pads. · If you are uncomfortable, try ice packs. Or you can sit in a warm sitz bath. Do these for 20 minutes at a time, as needed. · Use hydrocortisone cream for pain and itching. Two examples are Anusol and Preparation H Hydrocortisone. · Ask your doctor about taking an over-the-counter stool softener. Consider breastfeeding · Experts recommend that women breastfeed for 1 year or longer. · Breast milk may help protect your child from some health problems.  babies are less likely than formula-fed babies to: 
? Get ear infections, colds, diarrhea, and pneumonia. ? Be obese or get diabetes later in life. · Women who breastfeed have less bleeding after the birth. Their uteruses also shrink back faster. · Some women who breastfeed lose weight faster. Making milk burns calories. · Breastfeeding can lower your risk of breast cancer, ovarian cancer, and osteoporosis. Decide about circumcision for boys · As you make this decision, it may help to think about your personal, Moravian, and family traditions. You get to decide if you will keep your son's penis natural or if he will be circumcised. · If you decide that you would like to have your baby circumcised, talk with your doctor. You can share your concerns about pain. And you can discuss your preferences for anesthesia. Where can you learn more? Go to http://www.gray.com/ Enter Z677 in the search box to learn more about \"Weeks 32 to 34 of Your Pregnancy: Care Instructions. \" Current as of: February 11, 2020               Content Version: 12.6 © 8393-2958 [employer]. Care instructions adapted under license by [employer] (which disclaims liability or warranty for this information). If you have questions about a medical condition or this instruction, always ask your healthcare professional. [place] [address] any warranty or liability for your use of this information.

## 2020-11-05 NOTE — PROGRESS NOTES
Problem List  Date Reviewed: 10/22/2020          Codes Class Noted    Encounter for supervision of other normal pregnancy, first trimester ICD-10-CM: Z34.81  ICD-9-CM: V22.1  5/28/2020    Overview Addendum 10/9/2020  3:37 PM by Roxane Juarez LPN     [facility] 44/02/3831 by D=US  NIPTS- normal male  Horizon- negative  Varicella and parvo immune   UTI PCP 6/2020- repeat cx 7/7  AFP neg  Failed 1hr- 140 3hr, wnl  Anemia 9.9 @ 28wks  Flu vaccine 10/9

## 2020-11-09 ENCOUNTER — ROUTINE PRENATAL (OUTPATIENT)
Dept: OBGYN CLINIC | Age: 25
End: 2020-11-09
Payer: COMMERCIAL

## 2020-11-09 VITALS
HEIGHT: 65 IN | BODY MASS INDEX: 23.99 KG/M2 | DIASTOLIC BLOOD PRESSURE: 64 MMHG | SYSTOLIC BLOOD PRESSURE: 118 MMHG | WEIGHT: 144 LBS

## 2020-11-09 DIAGNOSIS — Z34.80 ENCOUNTER FOR SUPERVISION OF OTHER NORMAL PREGNANCY, UNSPECIFIED TRIMESTER: Primary | ICD-10-CM

## 2020-11-09 PROCEDURE — 0502F SUBSEQUENT PRENATAL CARE: CPT | Performed by: OBSTETRICS & GYNECOLOGY

## 2020-11-09 PROCEDURE — 76815 OB US LIMITED FETUS(S): CPT | Performed by: OBSTETRICS & GYNECOLOGY

## 2020-11-09 NOTE — PATIENT INSTRUCTIONS

## 2020-11-09 NOTE — PROGRESS NOTES
LIMITED OB SCAN  A SINGLE VERTEX 33W6D IUP IS SEEN. FETAL CARDIAC MOTION OBSERVED. LIMITED ANATOMY WAS VISUALIZED AND APPEARS WNL. APPROPRIATE FETAL GROWTH IS SEEN. SIZE = DATES.   GABRIEL AND PLACENTA APPEAR WNL    Problem List  Date Reviewed: 11/5/2020          Codes Class Noted    Encounter for supervision of other normal pregnancy, first trimester ICD-10-CM: Z34.81  ICD-9-CM: V22.1  5/28/2020    Overview Addendum 10/9/2020  3:37 PM by Eliseo Diego LPN     [facility] 58/51/1098 by D=US  NIPTS- normal male  Horizon- negative  Varicella and parvo immune   UTI PCP 6/2020- repeat cx 7/7  AFP neg  Failed 1hr- 140 3hr, wnl  Anemia 9.9 @ 28wks  Flu vaccine 10/9

## 2020-11-19 ENCOUNTER — TELEPHONE (OUTPATIENT)
Dept: OBGYN CLINIC | Age: 25
End: 2020-11-19

## 2020-11-19 RX ORDER — BUTALBITAL, ACETAMINOPHEN AND CAFFEINE 50; 325; 40 MG/1; MG/1; MG/1
1 TABLET ORAL
Qty: 30 TAB | Refills: 0 | Status: SHIPPED | OUTPATIENT
Start: 2020-11-19

## 2020-11-19 NOTE — TELEPHONE ENCOUNTER
Patient said that the pharmacy gave her butalbital-acetaminophen-caffeine -40 tabs instead of the name \"fioricet\". Is this okay? Yes, same medication.

## 2020-11-24 ENCOUNTER — ROUTINE PRENATAL (OUTPATIENT)
Dept: OBGYN CLINIC | Age: 25
End: 2020-11-24
Payer: COMMERCIAL

## 2020-11-24 VITALS — WEIGHT: 147.2 LBS | BODY MASS INDEX: 24.5 KG/M2 | DIASTOLIC BLOOD PRESSURE: 70 MMHG | SYSTOLIC BLOOD PRESSURE: 126 MMHG

## 2020-11-24 DIAGNOSIS — Z34.80 ENCOUNTER FOR SUPERVISION OF OTHER NORMAL PREGNANCY, UNSPECIFIED TRIMESTER: Primary | ICD-10-CM

## 2020-11-24 PROCEDURE — 0502F SUBSEQUENT PRENATAL CARE: CPT | Performed by: OBSTETRICS & GYNECOLOGY

## 2020-11-24 NOTE — PATIENT INSTRUCTIONS

## 2020-11-24 NOTE — PROGRESS NOTES
Problem List  Date Reviewed: 11/9/2020          Codes Class Noted    Encounter for supervision of other normal pregnancy, first trimester ICD-10-CM: Z34.81  ICD-9-CM: V22.1  5/28/2020    Overview Addendum 10/9/2020  3:37 PM by LIANE Cash 39 09/72/8880 by D=US  NIPTS- normal male  Horizon- negative  Varicella and parvo immune   UTI PCP 6/2020- repeat cx 7/7  AFP neg  Failed 1hr- 140 3hr, wnl  Anemia 9.9 @ 28wks  Flu vaccine 10/9

## 2020-11-25 ENCOUNTER — HOSPITAL ENCOUNTER (INPATIENT)
Age: 25
LOS: 4 days | Discharge: HOME | End: 2020-11-29
Attending: OBSTETRICS & GYNECOLOGY | Admitting: OBSTETRICS & GYNECOLOGY
Payer: COMMERCIAL

## 2020-11-25 ENCOUNTER — ROUTINE PRENATAL (OUTPATIENT)
Dept: OBGYN CLINIC | Age: 25
End: 2020-11-25
Payer: COMMERCIAL

## 2020-11-25 ENCOUNTER — TELEPHONE (OUTPATIENT)
Dept: OBGYN CLINIC | Age: 25
End: 2020-11-25

## 2020-11-25 VITALS
DIASTOLIC BLOOD PRESSURE: 78 MMHG | BODY MASS INDEX: 24.26 KG/M2 | SYSTOLIC BLOOD PRESSURE: 139 MMHG | HEIGHT: 65 IN | WEIGHT: 145.6 LBS

## 2020-11-25 DIAGNOSIS — Z34.80 ENCOUNTER FOR SUPERVISION OF OTHER NORMAL PREGNANCY, UNSPECIFIED TRIMESTER: Primary | ICD-10-CM

## 2020-11-25 DIAGNOSIS — O26.893 LOW BACK PAIN DURING PREGNANCY, THIRD TRIMESTER: ICD-10-CM

## 2020-11-25 DIAGNOSIS — M54.50 LOW BACK PAIN DURING PREGNANCY, THIRD TRIMESTER: ICD-10-CM

## 2020-11-25 DIAGNOSIS — Z3A.36 36 WEEKS GESTATION OF PREGNANCY: ICD-10-CM

## 2020-11-25 PROBLEM — O60.03 PRETERM LABOR WITHOUT DELIVERY, THIRD TRIMESTER: Status: ACTIVE | Noted: 2020-11-25

## 2020-11-25 PROBLEM — Z37.9 NORMAL LABOR: Status: ACTIVE | Noted: 2020-11-25

## 2020-11-25 LAB
ABO + RH BLD: NORMAL
BASOPHILS # BLD: 0 K/UL (ref 0–0.1)
BASOPHILS NFR BLD: 0 % (ref 0–1)
BLOOD GROUP ANTIBODIES SERPL: NORMAL
COVID-19 RAPID TEST, COVR: NOT DETECTED
DIFFERENTIAL METHOD BLD: ABNORMAL
EOSINOPHIL # BLD: 0 K/UL (ref 0–0.4)
EOSINOPHIL NFR BLD: 0 % (ref 0–7)
ERYTHROCYTE [DISTWIDTH] IN BLOOD BY AUTOMATED COUNT: 13 % (ref 11.5–14.5)
HCT VFR BLD AUTO: 34.1 % (ref 35–47)
HEALTH STATUS, XMCV2T: NORMAL
HGB BLD-MCNC: 11.5 G/DL (ref 11.5–16)
IMM GRANULOCYTES # BLD AUTO: 0.1 K/UL (ref 0–0.04)
IMM GRANULOCYTES NFR BLD AUTO: 1 % (ref 0–0.5)
LYMPHOCYTES # BLD: 1.1 K/UL (ref 0.8–3.5)
LYMPHOCYTES NFR BLD: 7 % (ref 12–49)
MCH RBC QN AUTO: 30.6 PG (ref 26–34)
MCHC RBC AUTO-ENTMCNC: 33.7 G/DL (ref 30–36.5)
MCV RBC AUTO: 90.7 FL (ref 80–99)
MONOCYTES # BLD: 1 K/UL (ref 0–1)
MONOCYTES NFR BLD: 7 % (ref 5–13)
NEUTS SEG # BLD: 12.7 K/UL (ref 1.8–8)
NEUTS SEG NFR BLD: 85 % (ref 32–75)
NRBC # BLD: 0 K/UL (ref 0–0.01)
NRBC BLD-RTO: 0 PER 100 WBC
PLATELET # BLD AUTO: 219 K/UL (ref 150–400)
PMV BLD AUTO: 9.2 FL (ref 8.9–12.9)
RBC # BLD AUTO: 3.76 M/UL (ref 3.8–5.2)
SOURCE, COVRS: NORMAL
SPECIMEN EXP DATE BLD: NORMAL
SPECIMEN SOURCE, FCOV2M: NORMAL
SPECIMEN TYPE, XMCV1T: NORMAL
WBC # BLD AUTO: 14.8 K/UL (ref 3.6–11)

## 2020-11-25 PROCEDURE — 75410000000 HC DELIVERY VAGINAL/SINGLE: Performed by: OBSTETRICS & GYNECOLOGY

## 2020-11-25 PROCEDURE — 86900 BLOOD TYPING SEROLOGIC ABO: CPT

## 2020-11-25 PROCEDURE — 36415 COLL VENOUS BLD VENIPUNCTURE: CPT

## 2020-11-25 PROCEDURE — 65270000029 HC RM PRIVATE

## 2020-11-25 PROCEDURE — 75410000002 HC LABOR FEE PER 1 HR: Performed by: OBSTETRICS & GYNECOLOGY

## 2020-11-25 PROCEDURE — 74011250636 HC RX REV CODE- 250/636: Performed by: OBSTETRICS & GYNECOLOGY

## 2020-11-25 PROCEDURE — 85025 COMPLETE CBC W/AUTO DIFF WBC: CPT

## 2020-11-25 PROCEDURE — 76060000078 HC EPIDURAL ANESTHESIA: Performed by: ANESTHESIOLOGY

## 2020-11-25 PROCEDURE — 75810000275 HC EMERGENCY DEPT VISIT NO LEVEL OF CARE

## 2020-11-25 PROCEDURE — 74011000258 HC RX REV CODE- 258: Performed by: OBSTETRICS & GYNECOLOGY

## 2020-11-25 PROCEDURE — 87635 SARS-COV-2 COVID-19 AMP PRB: CPT

## 2020-11-25 PROCEDURE — 0502F SUBSEQUENT PRENATAL CARE: CPT | Performed by: OBSTETRICS & GYNECOLOGY

## 2020-11-25 PROCEDURE — 99218 HC RM OBSERVATION: CPT

## 2020-11-25 PROCEDURE — 75410000003 HC RECOV DEL/VAG/CSECN EA 0.5 HR: Performed by: OBSTETRICS & GYNECOLOGY

## 2020-11-25 RX ORDER — ACETAMINOPHEN 325 MG/1
1000 TABLET ORAL
COMMUNITY
End: 2022-05-27

## 2020-11-25 RX ORDER — FENTANYL CITRATE 50 UG/ML
100 INJECTION, SOLUTION INTRAMUSCULAR; INTRAVENOUS ONCE
Status: COMPLETED | OUTPATIENT
Start: 2020-11-25 | End: 2020-11-25

## 2020-11-25 RX ORDER — SODIUM CHLORIDE 0.9 % (FLUSH) 0.9 %
5-40 SYRINGE (ML) INJECTION EVERY 8 HOURS
Status: CANCELLED | OUTPATIENT
Start: 2020-11-25

## 2020-11-25 RX ORDER — SODIUM CHLORIDE, SODIUM LACTATE, POTASSIUM CHLORIDE, CALCIUM CHLORIDE 600; 310; 30; 20 MG/100ML; MG/100ML; MG/100ML; MG/100ML
125 INJECTION, SOLUTION INTRAVENOUS CONTINUOUS
Status: DISCONTINUED | OUTPATIENT
Start: 2020-11-25 | End: 2020-11-29 | Stop reason: HOSPADM

## 2020-11-25 RX ORDER — ONDANSETRON 2 MG/ML
4 INJECTION INTRAMUSCULAR; INTRAVENOUS
Status: DISCONTINUED | OUTPATIENT
Start: 2020-11-25 | End: 2020-11-29 | Stop reason: HOSPADM

## 2020-11-25 RX ORDER — MAG HYDROX/ALUMINUM HYD/SIMETH 200-200-20
30 SUSPENSION, ORAL (FINAL DOSE FORM) ORAL
Status: DISCONTINUED | OUTPATIENT
Start: 2020-11-25 | End: 2020-11-27

## 2020-11-25 RX ORDER — OXYTOCIN/RINGER'S LACTATE 30/500 ML
10 PLASTIC BAG, INJECTION (ML) INTRAVENOUS AS NEEDED
Status: CANCELLED | OUTPATIENT
Start: 2020-11-25

## 2020-11-25 RX ORDER — SODIUM CHLORIDE 0.9 % (FLUSH) 0.9 %
5-40 SYRINGE (ML) INJECTION AS NEEDED
Status: CANCELLED | OUTPATIENT
Start: 2020-11-25

## 2020-11-25 RX ORDER — OXYTOCIN/RINGER'S LACTATE 30/500 ML
95 PLASTIC BAG, INJECTION (ML) INTRAVENOUS AS NEEDED
Status: CANCELLED | OUTPATIENT
Start: 2020-11-25

## 2020-11-25 RX ORDER — FERROUS SULFATE 137(45) MG
142 TABLET, EXTENDED RELEASE ORAL
COMMUNITY
End: 2022-05-27

## 2020-11-25 RX ADMIN — SODIUM CHLORIDE 5 MILLION UNITS: 900 INJECTION INTRAVENOUS at 21:59

## 2020-11-25 RX ADMIN — SODIUM CHLORIDE, SODIUM LACTATE, POTASSIUM CHLORIDE, AND CALCIUM CHLORIDE 125 ML/HR: 600; 310; 30; 20 INJECTION, SOLUTION INTRAVENOUS at 22:17

## 2020-11-25 RX ADMIN — SODIUM CHLORIDE, SODIUM LACTATE, POTASSIUM CHLORIDE, AND CALCIUM CHLORIDE 1000 ML: 600; 310; 30; 20 INJECTION, SOLUTION INTRAVENOUS at 21:10

## 2020-11-25 RX ADMIN — FENTANYL CITRATE 100 MCG: 50 INJECTION, SOLUTION INTRAMUSCULAR; INTRAVENOUS at 21:20

## 2020-11-25 RX ADMIN — ONDANSETRON 4 MG: 2 INJECTION INTRAMUSCULAR; INTRAVENOUS at 21:17

## 2020-11-25 NOTE — PROGRESS NOTES
This is an unscheduled problem visit:    Chief Complaint   Low back pain and pelvic pressure with pregnancy    HPI  Patrice Leblanc is a 26 y.o. female who presents for the evaluation of LBP and pelvic pressure and mucous discharge for past day. + FM, + occasional contractions, no ROM, no bleeding. .     She says that she was 1 cm in the office yesterday. Patient's last menstrual period was 03/17/2020 (exact date). Past Medical History:   Diagnosis Date    Migraines      No past surgical history on file. Social History     Occupational History    Not on file   Tobacco Use    Smoking status: Never Smoker    Smokeless tobacco: Never Used   Substance and Sexual Activity    Alcohol use: Not on file    Drug use: Not on file    Sexual activity: Yes     Partners: Male     No family history on file. Allergies   Allergen Reactions    Zyrtec [Cetirizine] Hives     Prior to Admission medications    Medication Sig Start Date End Date Taking? Authorizing Provider   butalbital-acetaminophen-caffeine (FIORICET, ESGIC) -40 mg per tablet Take 1 Tab by mouth every four (4) hours as needed for Headache. 11/19/20   Gigi Giles MD   AMBULATORY BREAST PUMP Dispense 1 electric breast pump. Use as directed. 10/16/20   Gigi Giles MD   PNV No12-Iron-FA-DSS-OM-3 29 mg iron-1 mg -50 mg CPKD Take  by mouth. Provider, Historical        Review of Systems: A comprehensive review of systems was negative except for that written in the HPI. Objective:  Visit Vitals  /78   Ht 5' 5\" (1.651 m)   Wt 145 lb 9.6 oz (66 kg)   LMP 03/17/2020 (Exact Date)   BMI 24.23 kg/m²       Physical Exam:   General appearance - alert, well appearing, and in no distress  Abdomen -  Gravid, NT  Pelvic Exam: cervix 70/0.5 cm/-2/vtx. Lymph- no adenopathy palpable  Skin-Warm and dry.  no hyperpigmentation, vitiligo, or suspicious lesions           Assessment:   Low ack pain and pelvic pressure associated with advanced pregnancy. No evidence of labor or ROM. Plan:   Local heat to the back, tylenol. Labor and ROM precuations      RTO prn if symptoms persist or worsen. Instructions given to pt. Handouts given to pt.

## 2020-11-25 NOTE — TELEPHONE ENCOUNTER
Call received at 12:15PM        [address] patient     26year old patient  36w1d pregnant last seen in the office yesterday    Patient denies vaginal bleeding ,ROM and reports positive fetal movement.   Patient calling to say that since 4 am she has been having back pain that some time wraps around to her stomach and is increased in discomfort  Patient reports constant pain at 8 on the pain scale of 1-10    Patient was placed on the schedule to be seen by the work MD sherman 1:50Pm per Dr. Matthew Arroyo

## 2020-11-25 NOTE — PATIENT INSTRUCTIONS

## 2020-11-25 NOTE — PROGRESS NOTES
Problem List  Date Reviewed: 11/24/2020          Codes Class Noted    Encounter for supervision of other normal pregnancy, first trimester ICD-10-CM: Z34.81  ICD-9-CM: V22.1  5/28/2020    Overview Addendum 10/9/2020  3:37 PM by LIANE Duffy 39 93/47/8997 by D=US  NIPTS- normal male  Horizon- negative  Varicella and parvo immune   UTI PCP 6/2020- repeat cx 7/7  AFP neg  Failed 1hr- 140 3hr, wnl  Anemia 9.9 @ 28wks  Flu vaccine 10/9

## 2020-11-26 ENCOUNTER — ANESTHESIA (OUTPATIENT)
Dept: LABOR AND DELIVERY | Age: 25
End: 2020-11-26
Payer: COMMERCIAL

## 2020-11-26 ENCOUNTER — ANESTHESIA EVENT (OUTPATIENT)
Dept: LABOR AND DELIVERY | Age: 25
End: 2020-11-26
Payer: COMMERCIAL

## 2020-11-26 PROBLEM — O60.03 PREMATURE LABOR IN THIRD TRIMESTER: Status: ACTIVE | Noted: 2020-11-26

## 2020-11-26 PROBLEM — Z34.81 ENCOUNTER FOR SUPERVISION OF OTHER NORMAL PREGNANCY, FIRST TRIMESTER: Status: RESOLVED | Noted: 2020-05-28 | Resolved: 2020-11-26

## 2020-11-26 PROCEDURE — 77030005513 HC CATH URETH FOL11 MDII -B

## 2020-11-26 PROCEDURE — 74011250636 HC RX REV CODE- 250/636: Performed by: OBSTETRICS & GYNECOLOGY

## 2020-11-26 PROCEDURE — 74011000258 HC RX REV CODE- 258: Performed by: OBSTETRICS & GYNECOLOGY

## 2020-11-26 PROCEDURE — 74011250636 HC RX REV CODE- 250/636: Performed by: ANESTHESIOLOGY

## 2020-11-26 PROCEDURE — 74011000250 HC RX REV CODE- 250: Performed by: ANESTHESIOLOGY

## 2020-11-26 PROCEDURE — 75410000002 HC LABOR FEE PER 1 HR: Performed by: OBSTETRICS & GYNECOLOGY

## 2020-11-26 PROCEDURE — 65270000029 HC RM PRIVATE

## 2020-11-26 PROCEDURE — 99218 HC RM OBSERVATION: CPT

## 2020-11-26 PROCEDURE — 00HU33Z INSERTION OF INFUSION DEVICE INTO SPINAL CANAL, PERCUTANEOUS APPROACH: ICD-10-PCS | Performed by: ANESTHESIOLOGY

## 2020-11-26 PROCEDURE — 77030014125 HC TY EPDRL BBMI -B: Performed by: ANESTHESIOLOGY

## 2020-11-26 PROCEDURE — 2709999900 HC NON-CHARGEABLE SUPPLY

## 2020-11-26 RX ORDER — EPHEDRINE SULFATE/0.9% NACL/PF 50 MG/5 ML
10 SYRINGE (ML) INTRAVENOUS
Status: DISCONTINUED | OUTPATIENT
Start: 2020-11-26 | End: 2020-11-27

## 2020-11-26 RX ORDER — NALBUPHINE HYDROCHLORIDE 10 MG/ML
10 INJECTION, SOLUTION INTRAMUSCULAR; INTRAVENOUS; SUBCUTANEOUS
Status: DISCONTINUED | OUTPATIENT
Start: 2020-11-26 | End: 2020-11-27

## 2020-11-26 RX ORDER — LIDOCAINE HYDROCHLORIDE AND EPINEPHRINE 15; 5 MG/ML; UG/ML
INJECTION, SOLUTION EPIDURAL AS NEEDED
Status: DISCONTINUED | OUTPATIENT
Start: 2020-11-26 | End: 2020-11-27 | Stop reason: HOSPADM

## 2020-11-26 RX ORDER — FENTANYL CITRATE 50 UG/ML
100 INJECTION, SOLUTION INTRAMUSCULAR; INTRAVENOUS ONCE
Status: COMPLETED | OUTPATIENT
Start: 2020-11-26 | End: 2020-11-26

## 2020-11-26 RX ORDER — FENTANYL CITRATE 50 UG/ML
INJECTION, SOLUTION INTRAMUSCULAR; INTRAVENOUS AS NEEDED
Status: DISCONTINUED | OUTPATIENT
Start: 2020-11-26 | End: 2020-11-27 | Stop reason: HOSPADM

## 2020-11-26 RX ORDER — CALCIUM CARBONATE 200(500)MG
200 TABLET,CHEWABLE ORAL
Status: DISCONTINUED | OUTPATIENT
Start: 2020-11-26 | End: 2020-11-27

## 2020-11-26 RX ORDER — FENTANYL/BUPIVACAINE/NS/PF 2-1250MCG
10 PREFILLED PUMP RESERVOIR EPIDURAL CONTINUOUS
Status: DISCONTINUED | OUTPATIENT
Start: 2020-11-26 | End: 2020-11-27

## 2020-11-26 RX ORDER — CALCIUM CARBONATE 200(500)MG
200 TABLET,CHEWABLE ORAL
Status: DISCONTINUED | OUTPATIENT
Start: 2020-11-27 | End: 2020-11-26

## 2020-11-26 RX ADMIN — FENTANYL CITRATE 100 MCG: 50 INJECTION, SOLUTION INTRAMUSCULAR; INTRAVENOUS at 02:16

## 2020-11-26 RX ADMIN — LIDOCAINE HYDROCHLORIDE AND EPINEPHRINE 3.5 ML: 15; 5 INJECTION, SOLUTION EPIDURAL at 04:26

## 2020-11-26 RX ADMIN — FENTANYL CITRATE 25 MCG: 0.05 INJECTION, SOLUTION INTRAMUSCULAR; INTRAVENOUS at 04:18

## 2020-11-26 RX ADMIN — SODIUM CHLORIDE 2.5 MILLION UNITS: 9 INJECTION, SOLUTION INTRAVENOUS at 02:20

## 2020-11-26 RX ADMIN — Medication 10 ML/HR: at 23:37

## 2020-11-26 RX ADMIN — SODIUM CHLORIDE, SODIUM LACTATE, POTASSIUM CHLORIDE, AND CALCIUM CHLORIDE 125 ML/HR: 600; 310; 30; 20 INJECTION, SOLUTION INTRAVENOUS at 03:27

## 2020-11-26 RX ADMIN — SODIUM CHLORIDE 2.5 MILLION UNITS: 9 INJECTION, SOLUTION INTRAVENOUS at 06:26

## 2020-11-26 RX ADMIN — Medication 10 ML/HR: at 04:41

## 2020-11-26 RX ADMIN — Medication 10 ML/HR: at 12:46

## 2020-11-26 RX ADMIN — SODIUM CHLORIDE 2.5 MILLION UNITS: 9 INJECTION, SOLUTION INTRAVENOUS at 10:32

## 2020-11-26 RX ADMIN — SODIUM CHLORIDE 2.5 MILLION UNITS: 9 INJECTION, SOLUTION INTRAVENOUS at 18:42

## 2020-11-26 RX ADMIN — SODIUM CHLORIDE 2.5 MILLION UNITS: 9 INJECTION, SOLUTION INTRAVENOUS at 14:34

## 2020-11-26 RX ADMIN — SODIUM CHLORIDE, SODIUM LACTATE, POTASSIUM CHLORIDE, AND CALCIUM CHLORIDE 125 ML/HR: 600; 310; 30; 20 INJECTION, SOLUTION INTRAVENOUS at 12:53

## 2020-11-26 RX ADMIN — FENTANYL CITRATE 75 MCG: 0.05 INJECTION, SOLUTION INTRAMUSCULAR; INTRAVENOUS at 04:26

## 2020-11-26 RX ADMIN — Medication 10 ML/HR: at 18:42

## 2020-11-26 NOTE — ANESTHESIA PREPROCEDURE EVALUATION
Relevant Problems   No relevant active problems       Anesthetic History   No history of anesthetic complications            Review of Systems / Medical History  Patient summary reviewed and nursing notes reviewed    Pulmonary  Within defined limits                 Neuro/Psych             Comments: Migraines (last = 1 week ago) Cardiovascular  Within defined limits                Exercise tolerance: >4 METS     GI/Hepatic/Renal                Endo/Other  Within defined limits           Other Findings              Physical Exam    Airway             Cardiovascular    Rhythm: regular  Rate: normal         Dental  No notable dental hx       Pulmonary  Breath sounds clear to auscultation               Abdominal         Other Findings            Anesthetic Plan    ASA: 2  Anesthesia type: spinal and epidural            Anesthetic plan and risks discussed with: Patient      Informed consent obtained.

## 2020-11-26 NOTE — PROGRESS NOTES
The patient has continued to pass small amounts of blood tinged vaginal fluid. Her chux were inspected and the fluid seen was c/w with ROM.     Visit Vitals  /72   Pulse 95   Temp 99.5 °F (37.5 °C)   Resp 18   Ht 5' 5\" (1.651 m)   Wt 66 kg (145 lb 9.6 oz)   SpO2 99%   Breastfeeding Yes   BMI 24.23 kg/m²     FHR: 150 moderate variability, accelerations present, no decelerations, cat 1  [place]: contractions q 1-4 minutes  SVE: 4/100/-3 vtx, bulging forebag- amniotomy with clear fluid noted    Ass:  at 36 2/7 wks with  labor, PPROM  Plan: Labor management  Recheck cervix in 3-4 hours

## 2020-11-26 NOTE — ANESTHESIA PROCEDURE NOTES
CSE Block    Start time: 11/26/2020 4:13 AM  End time: 11/26/2020 4:31 AM  Performed by: Greta Lopez DO  Authorized by: Greta Lopez DO     Pre-Procedure  Indications: procedure for pain    preanesthetic checklist: patient identified, risks and benefits discussed, anesthesia consent, patient being monitored and timeout performed    Timeout Time: 04:12        Procedure:   Patient Position:  Seated  Prep Region:  Lumbar  Prep: Betadine and patient draped    Location:  L3-4    Epidural Needle:   Needle Type:  Mariya Tania  Needle Gauge:  18 G  Injection Technique:  Loss of resistance using air  Attempts:  1    Spinal Needle:   Needle Type:   Pavel  Needle Gauge:  27 G    Catheter:   Catheter Type:  Standard  Catheter Size:  20 G  Catheter at Skin Depth (cm):  8  Depth in Epidural Space (cm):  2.5  Events: no blood with aspiration, no cerebrospinal fluid with aspiration, no paresthesia and negative aspiration test    Test Dose:  Negative    Assessment:   Catheter Secured:  Tegaderm and tape  Insertion:  Uncomplicated  Patient tolerance:  Patient tolerated the procedure well with no immediate complications

## 2020-11-26 NOTE — PROGRESS NOTES
0730 Assessment done. Plan of care discussed. Pt without complaints. Call bell within reach. 0915 Pt tilted to left side. Pt without complaints. 1015 Chux changed. Basket ball sized amount of blood tinged fluid noted. Pt states at approx 0700 she heard a \"pop\" sound and felt fluid running down her buttocks. Will continue to monitor and inform Dr Curtis Griffin. 1245 Pt c/o intermittent pain in lower left hip and back. Pt given epidural button to give self bolus. Chux changed with small amount of blood tinged fluid noted. Pt turned to left side with pillow between legs. 36  Dr Curtis Griffin at bedside to assess labor progress. SVE 3/100/-3 BBOW. Dr Curtis Griffin was shown last two chux with blood tinged fluid on them. Shyann care performed and pt turned left lateral.  Pt states pain has improved since epidural bolus. 1420 Pt turned to right side. Chux changed. Grapefruit sized amount of watery blood tinged fluid noted. 5 Dr Curtis Griffin at bedside to to assess labor progress. Dr Curtis Griffin viewed chux with blood tinged watery fluid on them. SVE 4/100/-3  AROM/forebag with clear fluid noted. Pt positioned in semifowler position.     1900 Shift report given to Rebeca Heart ALHAJI

## 2020-11-26 NOTE — PROGRESS NOTES
Labor Progress Note  Patient seen, fetal heart rate and contraction pattern evaluated, patient examined.   Comfortable with epidural  Visit Vitals  BP (!) 105/55   Pulse (!) 117   Temp 99 °F (37.2 °C)   Resp 18   Ht 5' 5\" (1.651 m)   Wt 66 kg (145 lb 9.6 oz)   SpO2 100%   Breastfeeding Yes   BMI 24.23 kg/m²       Physical Exam:      Cervical Exam:  2 - 3 cm dilated    100% effaced    -2 station    Presenting Part: cephalic  Membranes:  Intact  Uterine Activity: Frequency: Every 3 minutes, Duration: 60 seconds and Intensity: moderate  Fetal Heart Rate: Baseline: 150 per minute  Variability: moderate  Accelerations: yes  Decelerations: none  Category: 1      Assessment/Plan:    Patient Active Problem List   Diagnosis Code     labor without delivery, third trimester O60.03    36 weeks gestation of pregnancy Z3A.36      labor without delivery, third trimester  Cervix is changing, though slowly  Continue penicillin  Will follow    Laurie Davison MD  2020

## 2020-11-26 NOTE — PROGRESS NOTES
Labor Progress Note  Patient seen, fetal heart rate and contraction pattern evaluated, patient examined.   She complains of pain  Visit Vitals  /69   Pulse 82   Temp 98.4 °F (36.9 °C)   Resp 20   Ht 5' 5\" (1.651 m)   Wt 66 kg (145 lb 9.6 oz)   SpO2 100%   Breastfeeding Yes   BMI 24.23 kg/m²     Physical Exam:  Cervical Exam  Dilation (cm): 2  Eff: 100 %  Station: -2  Position: Anterior  Vaginal exam done by? : Dr Paul       Membranes:  Intact  Uterine Activity: Frequency: Every 2 minutes, Duration: 80 seconds and Intensity: moderate  Fetal Heart Rate: Baseline: 150 per minute  Variability: moderate  Accelerations: yes  Decelerations: none  Category: 1      Assessment/Plan:    Patient Active Problem List   Diagnosis Code     labor without delivery, third trimester O60.03    36 weeks gestation of pregnancy Z3A.36      labor without delivery, third trimester  Cervix is changing  Will make inpatient  Continue penicillin  Fentanyl for pain, epidural as necessary    Laura Toledo MD  2020

## 2020-11-26 NOTE — H&P
History & Physical    Name: Marin Espinosa MRN: [id number]  SSN: [id number]    YOB: 1995  Age: 26 y.o. Sex: female        Subjective:     Estimated Date of Delivery: 20  OB History    Para Term  AB Living   3 0 0 0 2 0   SAB TAB Ectopic Molar Multiple Live Births   2 0 0 0 0 0      # Outcome Date GA Lbr Salbador/2nd Weight Sex Delivery Anes PTL Lv   3 Current            2 2020           1 2020             CC: Vaginal bleeding  Ms. Milton Boothe is admitted with pregnancy at 36w1d complaining of vaginal bleeding. She had a cervical exam in the office today, and yesterday, but the bleeding is more than she expected. She complains of a constant low back pain, with severe exacerbations. This is why she went back to the office. Her pains coincide with contractions. She also complains of decreased fetal movement. Prenatal course was normal. Please see prenatal records for details. Past Medical History:   Diagnosis Date    Anemia     Herpes simplex virus (HSV) infection     Migraines      No past surgical history on file. Social History     Occupational History    Not on file   Tobacco Use    Smoking status: Never Smoker    Smokeless tobacco: Never Used   Substance and Sexual Activity    Alcohol use: Not Currently    Drug use: Never    Sexual activity: Yes     Partners: Male     No family history on file. Allergies   Allergen Reactions    Zyrtec [Cetirizine] Hives     Prior to Admission medications    Medication Sig Start Date End Date Taking? Authorizing Provider   ferrous sulfate (Slow Fe) 142 mg (45 mg iron) ER tablet Take 142 mg by mouth Daily (before breakfast). Yes Provider, Historical   acetaminophen (TylenoL) 325 mg tablet Take 1,000 mg by mouth every four (4) hours as needed for Pain. Yes Provider, Historical   PNV No12-Iron-FA-DSS-OM-3 29 mg iron-1 mg -50 mg CPKD Take  by mouth.    Yes Provider, Historical   butalbital-acetaminophen-caffeine (FIORICET, ESGIC) -40 mg per tablet Take 1 Tab by mouth every four (4) hours as needed for Headache. 11/19/20   Ghada Wang MD   AMBULATORY BREAST PUMP Dispense 1 electric breast pump. Use as directed. 10/16/20   Ghada Wang MD        Review of Systems   Constitutional: Negative. HENT: Negative. Eyes: Negative. Cardiovascular: Negative. Gastrointestinal: Positive for abdominal pain, nausea and vomiting. Negative for constipation and diarrhea. Endocrine: Negative. Genitourinary: Positive for vaginal bleeding. Negative for genital sores. Contractions, decreased fetal movement   Musculoskeletal: Positive for back pain (low back). Negative for neck pain. Skin: Negative. Allergic/Immunologic: Negative. Neurological: Negative. Hematological: Negative. Psychiatric/Behavioral: Negative. Objective:     Vitals:  Vitals:    11/25/20 2015 11/25/20 2021   BP: 124/73    Pulse: (!) 104    Resp: 20    Temp: 98.4 °F (36.9 °C)    Weight:  66 kg (145 lb 9.6 oz)   Height:  5' 5\" (1.651 m)        Physical Exam  Vitals signs and nursing note reviewed. Exam conducted with a chaperone present. Constitutional:       Appearance: Normal appearance. Comments: Appears uncomfortable   HENT:      Head: Normocephalic and atraumatic. Eyes:      Extraocular Movements: Extraocular movements intact. Pupils: Pupils are equal, round, and reactive to light. Neck:      Musculoskeletal: Normal range of motion. Cardiovascular:      Rate and Rhythm: Normal rate and regular rhythm. Heart sounds: No murmur. Pulmonary:      Effort: Pulmonary effort is normal.      Breath sounds: No wheezing, rhonchi or rales. Abdominal:      Palpations: Abdomen is soft. Tenderness: There is no abdominal tenderness. There is no right CVA tenderness, left CVA tenderness, guarding or rebound. Comments: Gravid  Uterus nontender   Musculoskeletal: Normal range of motion.    Skin:     General: Skin is warm and dry. Neurological:      General: No focal deficit present. Mental Status: She is alert and oriented to person, place, and time. Mental status is at baseline. Cranial Nerves: No cranial nerve deficit. Sensory: No sensory deficit. Motor: No weakness. Psychiatric:         Mood and Affect: Mood normal.         Behavior: Behavior normal.         Cervical Exam: 1 cm dilated    90% effaced    -2 station    Presenting Part: cephalic  Cervical Position: anterior  Consistency: Soft  Uterine Activity: Frequency: Every 2 minutes   Duration: 80 seconds  Intensity: moderate  Membranes: Intact  Fetal Heart Rate: Baseline: 155 per minute  Variability: moderate  Accelerations: yes  Decelerations: variable (1 variable while vomiting)    Prenatal Labs:   Lab Results   Component Value Date/Time    Rubella, External 1.10-Immune 2020    HBsAg, External Negative 2020    HIV, External Negative 2020    Gonorrhea, External Negative 2020    Chlamydia, External Negative 2020    ABO,Rh A POS 2020          Impression/Plan:     Active Problems:     labor without delivery, third trimester (2020)      36 weeks gestation of pregnancy (2020)         Plan: Admit for labor. Group B Strep was Unknown.  labor without delivery, third trimester  She's having very frequent contractions. Will observe. Iv fluids, pain medicine and antiemetic  T&S CBC  Penicillin for GBS prophylaxis.

## 2020-11-26 NOTE — PROGRESS NOTES
I have received SBAR from Dr. Paul, have assumed care of the patient, and have introduced myself to the patient and her spouse. The patient presented with complaint of light vaginal bleeding that started yesterday at 1600 and onset of severe intermittent lower back pain.   She is now very comfortable with her epidural.    Visit Vitals  /67   Pulse 91   Temp 98.7 °F (37.1 °C)   Resp 18   Ht 5' 5\" (1.651 m)   Wt 66 kg (145 lb 9.6 oz)   SpO2 100%   Breastfeeding Yes   BMI 24.23 kg/m²     FHR: 145 moderate variability, accelerations present, no decelerations, cat 1  Sultana: contractions q 2-4 minutes  EFW: 6#  SVE: deferred, some bright red blood noted on chux  2/100/-2 vtx at 0720  Ass:  at 36 2/7 wks with  labor  Plan: PCN for unknown GBS  Labor management

## 2020-11-26 NOTE — PROGRESS NOTES
2030 Dr Kelsey Gtz made aware of pt arrival and complaint. 2034 Dr Kelsey Gtz at the bedside. 0227 Dr Kelsey Gtz made aware of pt increased pain. 12 SBAR report given to CITLALI Kang RN, care of pt turned over at this time.

## 2020-11-26 NOTE — PROGRESS NOTES
The patient is starting to feel some contractions on her left side.     Visit Vitals  BP (!) 106/58   Pulse 94   Temp 98.6 °F (37 °C)   Resp 18   Ht 5' 5\" (1.651 m)   Wt 66 kg (145 lb 9.6 oz)   SpO2 96%   Breastfeeding Yes   BMI 24.23 kg/m²     FHR: 145 moderate variability, accelerations present, no decelerations, cat 1  [place]: contractions q 5-7 minutes  SVE: 3/100/-1 vtx, bbow, adequate pelvis, small amount of blood on chux    Ass:  at 36 2/7 wks with  labor, no cervical change in over 5 hours, reactive cat 1 fetal tracing  Plan: Continue with expectant management

## 2020-11-27 PROCEDURE — 77030021125

## 2020-11-27 PROCEDURE — 74011000258 HC RX REV CODE- 258: Performed by: OBSTETRICS & GYNECOLOGY

## 2020-11-27 PROCEDURE — 75410000002 HC LABOR FEE PER 1 HR: Performed by: OBSTETRICS & GYNECOLOGY

## 2020-11-27 PROCEDURE — 59400 OBSTETRICAL CARE: CPT | Performed by: OBSTETRICS & GYNECOLOGY

## 2020-11-27 PROCEDURE — 74011250636 HC RX REV CODE- 250/636

## 2020-11-27 PROCEDURE — 2709999900 HC NON-CHARGEABLE SUPPLY

## 2020-11-27 PROCEDURE — 65270000029 HC RM PRIVATE

## 2020-11-27 PROCEDURE — 74011250637 HC RX REV CODE- 250/637: Performed by: OBSTETRICS & GYNECOLOGY

## 2020-11-27 PROCEDURE — 74011250636 HC RX REV CODE- 250/636: Performed by: OBSTETRICS & GYNECOLOGY

## 2020-11-27 RX ORDER — DIPHENHYDRAMINE HYDROCHLORIDE 50 MG/ML
12.5 INJECTION, SOLUTION INTRAMUSCULAR; INTRAVENOUS
Status: DISCONTINUED | OUTPATIENT
Start: 2020-11-27 | End: 2020-11-29 | Stop reason: HOSPADM

## 2020-11-27 RX ORDER — SIMETHICONE 80 MG
80 TABLET,CHEWABLE ORAL
Status: DISCONTINUED | OUTPATIENT
Start: 2020-11-27 | End: 2020-11-29 | Stop reason: HOSPADM

## 2020-11-27 RX ORDER — HYDROCORTISONE ACETATE PRAMOXINE HCL 2.5; 1 G/100G; G/100G
CREAM TOPICAL AS NEEDED
Status: DISCONTINUED | OUTPATIENT
Start: 2020-11-27 | End: 2020-11-29 | Stop reason: HOSPADM

## 2020-11-27 RX ORDER — DOCUSATE SODIUM 100 MG/1
100 CAPSULE, LIQUID FILLED ORAL
Status: DISCONTINUED | OUTPATIENT
Start: 2020-11-27 | End: 2020-11-29 | Stop reason: HOSPADM

## 2020-11-27 RX ORDER — NALOXONE HYDROCHLORIDE 0.4 MG/ML
0.4 INJECTION, SOLUTION INTRAMUSCULAR; INTRAVENOUS; SUBCUTANEOUS AS NEEDED
Status: DISCONTINUED | OUTPATIENT
Start: 2020-11-27 | End: 2020-11-29 | Stop reason: HOSPADM

## 2020-11-27 RX ORDER — IBUPROFEN 800 MG/1
800 TABLET ORAL EVERY 8 HOURS
Status: DISCONTINUED | OUTPATIENT
Start: 2020-11-27 | End: 2020-11-29 | Stop reason: HOSPADM

## 2020-11-27 RX ORDER — OXYTOCIN/RINGER'S LACTATE 30/500 ML
10 PLASTIC BAG, INJECTION (ML) INTRAVENOUS AS NEEDED
Status: DISCONTINUED | OUTPATIENT
Start: 2020-11-27 | End: 2020-11-29 | Stop reason: HOSPADM

## 2020-11-27 RX ORDER — OXYTOCIN/RINGER'S LACTATE 30/500 ML
95 PLASTIC BAG, INJECTION (ML) INTRAVENOUS AS NEEDED
Status: DISCONTINUED | OUTPATIENT
Start: 2020-11-27 | End: 2020-11-29 | Stop reason: HOSPADM

## 2020-11-27 RX ORDER — HYDROCODONE BITARTRATE AND ACETAMINOPHEN 5; 325 MG/1; MG/1
1 TABLET ORAL
Status: DISCONTINUED | OUTPATIENT
Start: 2020-11-27 | End: 2020-11-29 | Stop reason: HOSPADM

## 2020-11-27 RX ORDER — ONDANSETRON 2 MG/ML
4 INJECTION INTRAMUSCULAR; INTRAVENOUS
Status: DISCONTINUED | OUTPATIENT
Start: 2020-11-27 | End: 2020-11-27 | Stop reason: SDUPTHER

## 2020-11-27 RX ORDER — OXYTOCIN/RINGER'S LACTATE 30/500 ML
PLASTIC BAG, INJECTION (ML) INTRAVENOUS
Status: COMPLETED
Start: 2020-11-27 | End: 2020-11-27

## 2020-11-27 RX ORDER — ACETAMINOPHEN 325 MG/1
650 TABLET ORAL
Status: DISCONTINUED | OUTPATIENT
Start: 2020-11-27 | End: 2020-11-29 | Stop reason: HOSPADM

## 2020-11-27 RX ADMIN — IBUPROFEN 800 MG: 800 TABLET ORAL at 06:19

## 2020-11-27 RX ADMIN — OXYTOCIN 30000 MILLI-UNITS: 10 INJECTION INTRAVENOUS at 05:00

## 2020-11-27 RX ADMIN — SODIUM CHLORIDE 2.5 MILLION UNITS: 9 INJECTION, SOLUTION INTRAVENOUS at 00:19

## 2020-11-27 RX ADMIN — IBUPROFEN 800 MG: 800 TABLET ORAL at 14:30

## 2020-11-27 RX ADMIN — IBUPROFEN 800 MG: 800 TABLET ORAL at 22:00

## 2020-11-27 NOTE — L&D DELIVERY NOTE
Delivery Summary    Patient: Miranda Alvarado MRN: [id number]  SSN: [id number]    YOB: 1995  Age: 26 y.o. Sex: female       Information for the patient's :  Oswaldo Varma, Devika Varma [[id number]]       Labor Events:    Labor: Yes    Steroids: None   Cervical Ripening Date/Time:       Cervical Ripening Type: None   Antibiotics During Labor: Yes   Rupture Identifier:      Rupture Date/Time: 2020 5:10 PM   Rupture Type: AROM;Bulging   Amniotic Fluid Volume:      Amniotic Fluid Description: Clear    Amniotic Fluid Odor: None    Induction: None       Induction Date/Time:        Indications for Induction:      Augmentation: None   Augmentation Date/Time:      Indications for Augmentation:     Labor complications: Additional complications:        Delivery Events:  Indications For Episiotomy:     Episiotomy: Midline   Perineal Laceration(s):     Repaired:     Periurethral Laceration Location:      Repaired:     Labial Laceration Location:     Repaired:     Sulcal Laceration Location:     Repaired:     Vaginal Laceration Location:     Repaired:     Cervical Laceration Location:     Repaired:     Repair Suture: Vicryl 3-0   Number of Repair Packets:     Estimated Blood Loss (ml):  ml   Quantitative Blood Loss (ml)                Delivery Date: 2020    Delivery Time: 4:39 AM  Delivery Type: Vaginal, Spontaneous  Sex:  Male    Gestational Age: 43w3d   Delivery Clinician:  Izabel Lopez  Living Status: Living   Delivery Location: L&D room 209          APGARS  One minute Five minutes Ten minutes   Skin color: 1   1        Heart rate: 2   2        Grimace: 2   2        Muscle tone: 2   2        Breathin   2        Totals: 9   9            Presentation: Vertex    Position:        Resuscitation Method:        Meconium Stained:        Cord Information: 3 Vessels  Complications: None  Cord around:    Delayed cord clamping?  Yes  Cord clamped date/time:2020  4:39 AM  Disposition of Cord Blood: Discard    Blood Gases Sent?: No    Placenta:  Date/Time: 2020  4:54 AM  Removal: Expressed      Appearance: Normal      Measurements:  Birth Weight:        Birth Length:        Head Circumference:        Chest Circumference:       Abdominal Girth: Other Providers:   Katja JARAMILLO;LYNDA WATERS;KYLIE MARCH;ROBERT FRY;DUYEN BEACH;MICHELLE CHAVES, Obstetrician;Primary Nurse;Primary Howard Nurse;Tech;Nursery Nurse;Charge Nurse;Crna;Nurse Practitioner;Midwife;Nursery Nurse           Group B Strep: No results found for: Jose D Simms  Information for the patient's :  Tyson Puckettdi [[id number]]   No results found for: ABORH, PCTABR, PCTDIG, BILI, ABORHEXT, ABORH     No results for input(s): PCO2CB, PO2CB, HCO3I, SO2I, IBD, PTEMPI, SPECTI, PHICB, ISITE, IDEV, IALLEN in the last 72 hours. Delivery Note  While pushing the patient was noted to have a tight perineal band that was restricting delivery of the infant. A midline second degree episiotomy was cut resulting in  of a term viable female infant in OA presentation with apgars 9,9. Upon delivery the infant was placed on the mother's abdomen and clamping of the umbilical cord was delayed for 1 minute. The umbilical cord was the clamped and transected. The placenta was manually expressed and appeared intact. The episiotomy was repaired with 3-0 Vicryl.   QBL 475mL

## 2020-11-27 NOTE — PROGRESS NOTES
Pt progressing towards goals. 0650 Pt ambulated with steady gait to the bathroom. Shyann care reviewed and pt performed independently. Pt assisted back to bed.    0707  Bedside SBAR report given to Major Hum, care of patient turned over at this time.

## 2020-11-27 NOTE — PROGRESS NOTES
1900 Assumed care of pt at this time from [address] [place] [address], Kalin Faustin RN.    2368 Dr. Makenzie Cantu at bedside, SVE = 8/100/0.     0030 Bedside shift change report given to LIDIA Rivera RN (oncoming nurse) by JESSICA Vizcarra RN (offgoing nurse). Report included the following information SBAR, Kardex, Intake/Output, MAR, Recent Results and Med Rec Status.

## 2020-11-27 NOTE — LACTATION NOTE
This note was copied from a baby's chart. Baby delivered at 39 wks. Blood sugar 38. Assisted mother with waking baby, positioning, and latch. Baby latched well in side-lying with rhythmic sucks and swallows noted. Mother able to easily express drops to baby as well. Lots of info shared. Parents questions answered. Discussed with mother her plan for feeding. Reviewed the benefits of exclusive breast milk feeding during the hospital stay. Informed her of the risks of using formula to supplement in the first few days of life as well as the benefits of successful breast milk feeding; referred her to the Breastfeeding booklet about this information. She acknowledges understanding of information reviewed and states that it is her plan to breastfeed her infant. Will support her choice and offer additional information as needed. Reviewed breastfeeding basics:  How milk is made and normal  breastfeeding behaviors discussed. Supply and demand,  stomach size, early feeding cues, skin to skin bonding with comfortable positioning and baby led latch-on reviewed. How to identify signs of successful breastfeeding sessions reviewed; education on assymetrical latch, signs of effective latching vs shallow, in-effective latching, normal  feeding frequency and duration and expected infant output discussed. Hand Expression Education:  Mom taught how to manually hand express her colostrum. Emphasized the importance of providing infant with valuable colostrum as infant rests skin to skin at breast.  Aware to avoid extended periods of non-feeding. Aware to offer 10-20+ drops of colostrum every 2-3 hours until infant is latching and nursing effectively. Taught the rationale behind this low tech but highly effective evidence based practice.     Pt will successfully establish breastfeeding by feeding in response to early feeding cues or wake every 3h, will obtain deep latch, and will keep log of feedings/output. Taught to BF at hunger cues and or q 2-3 hrs and to offer 10-20 drops of hand expressed colostrum at any non-feeds.       Breast Assessment  Left Breast: Medium, Large  Left Nipple: Everted, Intact  Right Breast: Medium, Large  Right Nipple: Everted, Intact  Breast- Feeding Assessment  Attends Breast-Feeding Classes: No  Breast-Feeding Experience: No  Breast Trauma/Surgery: No  Type/Quality: Good  Lactation Consultant Visits  Breast-Feedings: Good   Mother/Infant Observation  Mother Observation: Breast comfortable, Close hold, Holds breast, Lets baby end feeding, Recognizes feeding cues, Nipple round on release  Infant Observation: Rhythmic suck, Opens mouth, Relaxed after feeding, Lips flanged, upper, Lips flanged, lower, Latches nipple and aereolae, Feeding cues  LATCH Documentation  Latch: Grasps breast, tongue down, lips flanged, rhythmic sucking  Audible Swallowing: A few with stimulation  Type of Nipple: Everted (after stimulation)  Comfort (Breast/Nipple): Soft/non-tender  Hold (Positioning): Full assist, teach one side, mother does other, staff holds  [facility] Score: 8

## 2020-11-28 PROCEDURE — 2709999900 HC NON-CHARGEABLE SUPPLY

## 2020-11-28 PROCEDURE — 65270000029 HC RM PRIVATE

## 2020-11-28 PROCEDURE — 74011250637 HC RX REV CODE- 250/637: Performed by: OBSTETRICS & GYNECOLOGY

## 2020-11-28 RX ADMIN — IBUPROFEN 800 MG: 800 TABLET ORAL at 23:36

## 2020-11-28 RX ADMIN — IBUPROFEN 800 MG: 800 TABLET ORAL at 06:16

## 2020-11-28 RX ADMIN — IBUPROFEN 800 MG: 800 TABLET ORAL at 14:38

## 2020-11-28 RX ADMIN — DOCUSATE SODIUM 100 MG: 100 CAPSULE, LIQUID FILLED ORAL at 11:08

## 2020-11-28 NOTE — ROUTINE PROCESS
Bedside and Verbal shift change report given to AFTAB Ambrose (oncoming nurse) by Manpreet Ibrahim. Antonio Bradshaw RN (offgoing nurse). Report included the following information SBAR, Kardex, Intake/Output, MAR and Recent Results.

## 2020-11-28 NOTE — LACTATION NOTE
This note was copied from a baby's chart. Mother was trying to breastfeed baby when  [facility] came to visit. Baby was circumcised this am and was sleepy - mother tried to breastfeed with  [facility]. Baby did latch onto right breast and took a few suckles but was not interested in breastfeeding. Mother reassured that this is normal  behavior post procedure and to continue skin to skin and express 10-20 drops of colostrum if baby is sleepy. Mother taught hand expression and 20 large drops easily expressed into baby's mouth. Reviewed feeding cues with parents. Current infant weight loss is -4.7% (WNL). Reviewed breastfeeding basics:  Supply and demand, breastfeed baby 8-12 times in 24 hr. Feed baby on demand, ewborn stomach size, early  Feeding cues, skin to skin, positioning and baby led latch-on, assymetrical latch with signs of good, deep latch vs shallow, feeding frequency and duration, and log sheet for tracking infant feedings and output. Breastfeeding Booklet and Warm line information given. Discussed typical  weight loss and the importance of infant weight checks with pediatrician 1-2 post discharge. Engorgement Care Guidelines:  Reviewed how milk is made and normal phases of milk production. Taught care of engorged breasts - frequent breastfeeding encouraged, cool packs and motrin as tolerated. Anticipatory guidance shared. Care for sore/tender nipples discussed:  ways to improve positioning and latch practiced and discussed, hand express colostrum after feedings and let air dry, light application of lanolin, hydrogel pads, seek comfortable laid back feeding position, start feedings on least sore side first.    Discussed eating a healthy diet. Instructed mother to eat a variety of foods in order to get a well balanced diet.  She should consume an extra 500 calories per day (more than her non-pregnant requirement.) These extra calories will help provide energy needed for optimal breast milk production. Mother also encouraged to \"drink to thirst\" and it is recommended that she drink fluids such as water, fruit/vegetable juice. Nutritious snacks should be available so that she can eat throughout the day to help satisfy her hunger and maintain a good milk supply. Reviewed effects/risks of late  birth on initiation of breastfeeding including infant's sleepiness, ineffective or missed breastfeedings, infant's decreased stamina to sustain prolonged latch and effective breastfeeding, decreased energy reserves related to low birth wt and inability to stimulate milk supply. Recommended interventions include skin to skin bonding at breast, hand expression of colostrum as infant rests at breast and initiation of breastfeeding as infant is able, initiation of pumping regimen to begin  as mom is able;Symphony pump set ups and instructions for use given. complement/supplement feeding as guided by neonatologist.     Mother will successfully establish breastfeeding by feeding in response to early feeding cues   or wake every 3h, will obtain deep latch, and will keep log of feedings/output. Taught to BF at hunger cues and or q 2-3 hrs and to offer 10-20 drops of hand expressed colostrum at any non-feeds. Breast Assessment  Left Breast: Medium, Large  Left Nipple: Everted, Intact  Right Breast: Medium, Large  Right Nipple: Everted, Intact  Breast- Feeding Assessment  Attends Breast-Feeding Classes: No  Breast-Feeding Experience: No  Breast Trauma/Surgery: No  Type/Quality: Good  Lactation Consultant Visits  Breast-Feedings: Attempted breast-feeding(Baby sleepy post circumcision- he took a few suckles. Mom tried 10 min. with LC. 20 large drops of colostrum expressed into baby's mouth. Symphony pump set up to stimulate supply. Instructions for use given.)  Mother/Infant Observation  Mother Observation: Alignment, Holds breast, Breast comfortable, Close hold  Infant Observation: Lips flanged, upper, Opens mouth, Frenulum checked, Latches nipple and aereolae, Lips flanged, lower  LATCH Documentation  Latch: Repeated attempts, hold nipple in mouth, stimulate to suck(Baby did a few suckles on right side- sleepy post circumcision. 20 drops expressed into baby's mouth)  Audible Swallowing: None  Type of Nipple: Everted (after stimulation)  Comfort (Breast/Nipple): Soft/non-tender  Hold (Positioning): Full assist, teach one side, mother does other, staff holds  LATCH Score: 6    Instructed mother to call [facility] for breastfeeding assistance.

## 2020-11-28 NOTE — PROGRESS NOTES
Post-Partum Progress Note    Patient doing well post-partum without significant complaint. She is voiding without difficulty, she reports normal lochia. Her pain is well controlled with oral pain medication. She is tolerating a general diet. Delivery information:  Information for the patient's :  Silas Thakkar, Male Jean Linear [[id number]]   Delivery of a 6 lb 12.5 oz (3.075 kg) male infant via Vaginal, Spontaneous on 2020 at 4:39 AM  by Sandy Catalan. Apgars were 9  and 9 . Vitals:    Patient Vitals for the past 8 hrs:   BP Temp Pulse Resp   20 0702 117/64 98.2 °F (36.8 °C) 88 16     Temp (24hrs), Av °F (36.7 °C), Min:97.6 °F (36.4 °C), Max:98.3 °F (36.8 °C)    Visit Vitals  /64 (BP 1 Location: Left arm, BP Patient Position: Sitting)   Pulse 88   Temp 98.2 °F (36.8 °C)   Resp 16   Ht 5' 5\" (1.651 m)   Wt 145 lb 9.6 oz (66 kg)   SpO2 (!) 85%   Breastfeeding Yes   BMI 24.23 kg/m²       Exam:    General: Patient without distress. Abdomen: soft, fundus firm at level of umbilicus, nontender  Lower extremities: negative for cords or tenderness. Labs: No results found for this or any previous visit (from the past 24 hour(s)). Assessment:    1. Postpartum S/P spontaneous vaginal delivery   2. Patient doing well without significant complications    Plan:  1. Continue routine postpartum care  2. Routine perineal care and maternal education   3.  Oral pain medications and bowel regimen as needed            4. rba of circ reviewed, pt desires      Evelyn Alfredo MD

## 2020-11-29 VITALS
BODY MASS INDEX: 24.26 KG/M2 | OXYGEN SATURATION: 100 % | TEMPERATURE: 98.3 F | SYSTOLIC BLOOD PRESSURE: 124 MMHG | HEIGHT: 65 IN | HEART RATE: 74 BPM | RESPIRATION RATE: 16 BRPM | DIASTOLIC BLOOD PRESSURE: 65 MMHG | WEIGHT: 145.6 LBS

## 2020-11-29 LAB
BACTERIA SPEC CULT: ABNORMAL
SERVICE CMNT-IMP: ABNORMAL

## 2020-11-29 PROCEDURE — 74011250637 HC RX REV CODE- 250/637: Performed by: OBSTETRICS & GYNECOLOGY

## 2020-11-29 PROCEDURE — 77030021125

## 2020-11-29 PROCEDURE — 2709999900 HC NON-CHARGEABLE SUPPLY

## 2020-11-29 RX ORDER — IBUPROFEN 600 MG/1
600 TABLET ORAL
Qty: 30 TAB | Refills: 0 | Status: SHIPPED | OUTPATIENT
Start: 2020-11-29 | End: 2022-04-19

## 2020-11-29 RX ADMIN — IBUPROFEN 800 MG: 800 TABLET ORAL at 09:19

## 2020-11-29 NOTE — PROGRESS NOTES
Post-Partum Progress Note    Patient doing well post-partum without significant complaint. She is voiding without difficulty, she reports normal lochia. Her pain is well controlled with oral pain medication. She is tolerating a general diet. Delivery information:  Information for the patient's :  Silvina Moamaury, [facility] [[id number]]   Delivery of a 6 lb 12.5 oz (3.075 kg) male infant via Vaginal, Spontaneous on 2020 at 4:39 AM  by Erica Johnston. Apgars were 9  and 9 . Vitals:    Patient Vitals for the past 8 hrs:   BP Temp Pulse Resp   20 0823 124/65 98.3 °F (36.8 °C) 74 16     Temp (24hrs), Av.2 °F (36.8 °C), Min:97.8 °F (36.6 °C), Max:98.6 °F (37 °C)    Visit Vitals  /65   Pulse 74   Temp 98.3 °F (36.8 °C)   Resp 16   Ht 5' 5\" (1.651 m)   Wt 145 lb 9.6 oz (66 kg)   SpO2 100%   Breastfeeding Yes   BMI 24.23 kg/m²       Exam:    General: Patient without distress. Abdomen: soft, fundus firm at level of umbilicus, nontender  Lower extremities: negative for cords or tenderness. Labs: No results found for this or any previous visit (from the past 24 hour(s)). Assessment:    1. Postpartum S/P spontaneous vaginal delivery   2. Patient doing well without significant complications    Plan:  1. Continue routine postpartum care  2. Routine perineal care and maternal education   3. Oral pain medications and bowel regimen as needed          4. Discharge planning  5.  Discussed postprocedure circ care        Satish Holliday MD

## 2020-11-29 NOTE — DISCHARGE INSTRUCTIONS
[facility]  [email or web address]  [phone]    Haley Camarillo MD, FACOG     POST DELIVERY DISCHARGE INSTRUCTIONS  FROM YOUR PHYSICIAN    Name: Socorro Corona  YOB: 1995    General:     Read all discharge information provided by the hospital    Diet/Diet Restrictions:  Eat healthy meals and snacks as desired. Eat foods that are high in fiber and low in fat and cholesterol. Drink eight 8-ounce glasses of water daily; avoid excessive caffeine intake. [email or web address] html  [email or web address].be    Medications:   See discharge medication list and read instructions carefully. Breast Feeding:  See instructions from your lactation consult. Call [phone] for more information or to locate a lactation consultant. [email or web address].teixeira.[email or web address]    Vaccines:  If you received the MMR vaccine postpartum you should wait three months until you get pregnant again. You, and close contacts, should make sure that the Tdap vaccine is up to date. This vaccine can decrease the risk of your baby getting pertussis or \"whooping cough. \"    You, and close contacts, should receive the influenza vaccine during flu season when appropriate. SalaryStart.tn    Tobacco Use: If you (or other people around the baby) smoke or use tobacco products, please try to  use and quit to improve your health and decrease risk to your baby. LimitBuy.nl. htm    Swelling in your Legs:  There are many fluid changes after delivery and you may have more swelling the first few days after delivery  Continue to drink plenty of water, avoid sitting or standing in one position for too long and elevate your feet above your heart, to help reduce some the pressure you may be feeling in your ankles and legs.      Section Incision:  Steri-strips or tape strips may be removed gently at home approximately 7- 10 days after surgery. Soaking the strips with a warm, wet cloth or taking a shower may make the strips easier to remove. Metal staples are usually removed within 3 to 10 days, either before you leave the hospital or in the office. Make an appointment if needed. Insorb absorbable staples may be used under the skin but you may see small white pieces as they dissolve. Skin glue or dermabond will fall off with time. Abdominal incisions should be kept clean by showering. It is not necessary to put soap on the incision; plain tap water is adequate. Avoid scrubbing the area and pat dry. The way your scar looks will change over time and may not reach its final appearance for up to a year. The area may feel either numb or sensitive to touch, which is normal.         Physical Activity / Restrictions / Safety:     Avoid heavy lifting, no more that 10 pounds, for 2-3 weeks. No driving while taking narcotic pain medication, of if you can not slam on the brakes. No intercourse for 4-6 weeks, no douching or tampon use until seen by your doctor for your postpartum visit. Use condoms as needed for contraception with sexual activity. You may resume normal exercise after you are cleared by your physician at your postpartum check. You may walk for exercise, as tolerated. Discharge Instructions/Special Treatment/Home Care Needs:     Continue your prenatal vitamins while breast feeding or pumping. Continue to use a squirt bottle with warm water on your perineum/bottom/episiotomy after each bathroom use until bleeding stops. Take stool softeners daily. For example, docusate over the counter stool softener. This is especially important if you are taking narcotic pain medications, because they can cause constipation. Call your doctor for the following:      If you have a fever over 100.4 degrees by mouth on two readings. If you have persistent vaginal bleeding heavier than a heavy menstrual period or persistent large clots or if you are bleeding so heavy it is making you feel weak. It is normal to pass larger clots when you first get out of bed: but if they persist, notify your physician. If you have red streaks or increased swelling of legs, painful red streaks on your breast.  If you have painful urination, or increased pain, redness or discharge with your incision. If you have any questions or concerns. Pain Management:     Take Acetaminophen (Tylenol), Ibuprofen (Advil, Motrin), prescribed pain medications as directed for pain. Do not take Perocet with Tylenol, they both contain acetaminophen. Use a warm water Sitz bath 3 times daily to relieve episiotomy, bottom/perineum or hemorrhoidal discomfort. Apply heating pad to  incision as needed. For hemorrhoidal discomfort, you can use Tucks and Anusol cream as needed and directed.     NSAID information for patients:  [email or web address]    Pain medication/narcotic information for patients:   Take your medicine exactly as prescribed   Store your medicine away from children and in a safe  place   Do not give your medicine to others   Do not drink alcohol while taking this medicine    Follow-Up Care:     Appointment with MD:  Dr. Juli Johnson  [phone]  Schedule your postpartum visit for six weeks        Additional Discharge Instructions    Please read all of your discharge instructions  Follow all of your medication instructions carefully  Call our office on the next business day to schedule your follow-up appointment  If you have any questions or concerns, please contact us at [phone] or if the situation is urgent contact   Become a [address] My Chart user so you can access information, results and appointments: go to [email or web address]. The [facility] is to bring compassion to healthcare and to be good help to those in need. We aim at providing quality healthcare with an emphasis on respect, justice, compassion, stewardship, integrity, growth and innovation.     If you did not receive excellent communication, compassionate care and an outstanding patient experience, please notify Jamilah Whitaker at [email or web address] or [phone] or discuss your concerns with me at your next visit so that we can meet our mission and your expectations    Duane Mess, MD  [address] [place] [address], [address]  [email or web address]   [phone]   Good Help to Those in [facility]

## 2020-11-29 NOTE — ROUTINE PROCESS
Discharge instructions given to patient including but not limited to signs and symptoms and who to call; restrictions and limitations; postpartum depression. All questions answered. Discharge supplies given to patient. Signature pad not working in room. Hard signed copy placed in chart. No prescription given to patient.

## 2020-11-29 NOTE — ROUTINE PROCESS
Bedside and Verbal shift change report given to TIM Tillman (oncoming nurse) by Sabino Raza (offgoing nurse). Report included the following information SBAR, Procedure Summary, Intake/Output, MAR, Accordion, Recent Results and Med Rec Status.

## 2020-12-01 NOTE — DISCHARGE SUMMARY
Obstetrical Discharge Summary     Name: Cristo Stallworth MRN: [id number]  SSN: [id number]    YOB: 1995  Age: 26 y.o. Sex: female      Admit Date: 2020    Discharge Date: 2020     Admitting Physician: Raymond Adames MD     Attending Physician:  Carlos Alberto Farris MD    Admission Diagnoses: Normal labor [O80, Z37.9]; Premature labor in third trimester [O60.03]    Discharge Diagnoses:   Information for the patient's :  Joey China, Male Emmajodee Oswald [[id number]]   Delivery of a 6 lb 12.5 oz (3.075 kg) male infant via Vaginal, Spontaneous on 2020 at 4:39 AM  by Ric Recinos. Apgars were 9  and 9 . Additional Diagnoses:   Hospital Problems  Date Reviewed: 2020          Codes Class Noted POA     labor without delivery, third trimester ICD-10-CM: O60.03  ICD-9-CM: 644.03  2020 Yes        36 weeks gestation of pregnancy ICD-10-CM: Z3A.36  ICD-9-CM: V22.2  2020 Yes             Lab Results   Component Value Date/Time    Rubella, External 1.10-Immune 2020       Hospital Course: Normal hospital course following the delivery. Disposition: Home  Condition: Good    Patient Instructions:   Cannot display discharge medications since this patient is not currently admitted. Reference my discharge instructions.     Follow-up Appointments   Procedures    FOLLOW UP VISIT Appointment in: 6 Weeks     Standing Status:   Standing     Number of Occurrences:   1     Order Specific Question:   Appointment in     Answer:   6 Weeks        Signed By:  Carlos Alberto Farris MD     2020

## 2020-12-30 ENCOUNTER — OFFICE VISIT (OUTPATIENT)
Dept: OBGYN CLINIC | Age: 25
End: 2020-12-30
Payer: COMMERCIAL

## 2020-12-30 VITALS
HEIGHT: 65 IN | SYSTOLIC BLOOD PRESSURE: 131 MMHG | WEIGHT: 124 LBS | BODY MASS INDEX: 20.66 KG/M2 | DIASTOLIC BLOOD PRESSURE: 70 MMHG

## 2020-12-30 PROCEDURE — 0503F POSTPARTUM CARE VISIT: CPT | Performed by: OBSTETRICS & GYNECOLOGY

## 2020-12-30 NOTE — PROGRESS NOTES
Postpartum evaluation    Viktor Castro is a 26 y.o. female who presents for a postpartum exam.     She is now six weeks post normal spontaneous vaginal delivery 11/25/2020    Her baby is doing well. She has had menses since delivery. She has had the following significant problems since her delivery: none    The patient is breast feeding without difficulty. The patient would like to use OCP or IUD for birth control. She is currently taking: no medications, just completed antibiotics for mastitis     She is due for her next AE in 3 months.      Visit Vitals  /70   Ht 5' 5\" (1.651 m)   Wt 124 lb (56.2 kg)   LMP 12/25/2020   Breastfeeding Yes   BMI 20.63 kg/m²       PHYSICAL EXAMINATION    Constitutional  · Appearance: well-nourished, well developed, alert, in no acute distress    HENT  · Head and Face: appears normal    Neck  · Inspection/Palpation: normal appearance, no masses or tenderness  · Lymph Nodes: no lymphadenopathy present  · Thyroid: gland size normal, nontender, no nodules or masses present on palpation    Breasts  · Inspection of Breasts: breasts symmetrical, no skin changes, no discharge present, nipple appearance normal, no skin retraction present  · Palpation of Breasts and Axillae: no masses present on palpation, no breast tenderness  · Axillary Lymph Nodes: no lymphadenopathy present    Gastrointestinal  · Abdominal Examination: abdomen non-tender to palpation, normal bowel sounds, no masses present  · Liver and spleen: no hepatomegaly present, spleen not palpable  · Hernias: no hernias identified    Genitourinary  · External Genitalia: normal appearance for age, no discharge present, no tenderness present, no inflammatory lesions present, no masses present, no atrophy present  · Vagina: normal vaginal vault without central or paravaginal defects, no discharge present, no inflammatory lesions present, no masses present  · Bladder: non-tender to palpation  · Urethra: appears normal  · Cervix: normal   · Uterus: normal size, shape and consistency  · Adnexa: no adnexal tenderness present, no adnexal masses present  · Perineum: perineum within normal limits, no evidence of trauma, no rashes or skin lesions present  · Anus: anus within normal limits, no hemorrhoids present  · Inguinal Lymph Nodes: no lymphadenopathy present    Skin  · General Inspection: no rash, no lesions identified    Neurologic/Psychiatric  · Mental Status:  · Orientation: grossly oriented to person, place and time  · Mood and Affect: mood normal, affect appropriate    Assessment:  Normal postpartum check    Plan:  RTO for AE.   Call with menses for Mirena

## 2020-12-30 NOTE — PATIENT INSTRUCTIONS
Postpartum: Care Instructions Your Care Instructions After childbirth (postpartum period), your body goes through many changes. Some of these changes happen over several weeks. In the hours after delivery, your body will begin to recover from childbirth while it prepares to breastfeed your . You may feel emotional during this time. Your hormones can shift your mood without warning for no clear reason. In the first couple of weeks after childbirth, many women have emotions that change from happy to sad. You may find it hard to sleep. You may cry a lot. This is called the \"baby blues. \" These overwhelming emotions often go away within a couple of days or weeks. But it's important to discuss your feelings with your doctor. It is easy to get too tired and overwhelmed during the first weeks after childbirth. Don't try to do too much. Get rest whenever you can, accept help from others, and eat well and drink plenty of fluids. In the first couple of weeks after giving birth, your doctor or midwife may want to check in with you and make a plan for any follow-up care you may need. You will likely have a complete postpartum visit in the first 3 months after delivery. At that time, your doctor or midwife will check on your recovery from childbirth. He or she will also see how you are doing with your emotions and talk about your concerns or questions. Follow-up care is a key part of your treatment and safety. Be sure to make and go to all appointments, and call your doctor if you are having problems. It's also a good idea to know your test results and keep a list of the medicines you take. How can you care for yourself at home? · Sleep or rest when your baby sleeps. · Get help with household chores from family or friends, if you can. Do not try to do it all yourself. · If you have hemorrhoids or swelling or pain around the opening of your vagina, try using cold and heat. You can put ice or a cold pack on the area for 10 to 20 minutes at a time. Put a thin cloth between the ice and your skin. Also try sitting in a few inches of warm water (sitz bath) 3 times a day and after bowel movements. · Take pain medicines exactly as directed. ? If the doctor gave you a prescription medicine for pain, take it as prescribed. ? If you are not taking a prescription pain medicine, ask your doctor if you can take an over-the-counter medicine. · Eat more fiber to avoid constipation. Include foods such as whole-grain breads and cereals, raw vegetables, raw and dried fruits, and beans. · Drink plenty of fluids, enough so that your urine is light yellow or clear like water. If you have kidney, heart, or liver disease and have to limit fluids, talk with your doctor before you increase the amount of fluids you drink. · Do not rinse inside your vagina with fluids (douche). · If you have stitches, keep the area clean by pouring or spraying warm water over the area outside your vagina and anus after you use the toilet. · Keep a list of questions to ask your doctor or midwife. Your questions might be about: 
? Changes in your breasts, such as lumps or soreness. ? When to expect your menstrual period to start again. ? What form of birth control is best for you. ? Weight you have put on during the pregnancy. ? Exercise options. ? What foods and drinks are best for you, especially if you are breastfeeding. ? Problems you might be having with breastfeeding. ? When you can have sex. Some women may want to talk about lubricants for the vagina. ? Any feelings of sadness or restlessness that you are having. When should you call for help? Call 911 anytime you think you may need emergency care. For example, call if: 
  · You have thoughts of harming yourself, your baby, or another person.   · You passed out (lost consciousness).  
  · You have chest pain, are short of breath, or cough up blood.  
  · You have a seizure. Call your doctor now or seek immediate medical care if: 
  · Your vaginal bleeding seems to be getting heavier.  
  · You are dizzy or lightheaded, or you feel like you may faint.  
  · You have a fever.  
  · You have new or more belly pain.  
  · You have symptoms of a blood clot in your leg (called a deep vein thrombosis), such as: 
? Pain in the calf, back of the knee, thigh, or groin. ? Redness and swelling in your leg or groin.  
  · You have signs of preeclampsia, such as: 
? Sudden swelling of your face, hands, or feet. ? New vision problems (such as dimness, blurring, or seeing spots). ? A severe headache. Watch closely for changes in your health, and be sure to contact your doctor if: 
  · You have new or worse vaginal discharge.  
  · You feel sad or depressed.  
  · You are having problems with your breasts or breastfeeding. Where can you learn more? Go to http://www.gray.com/ Enter Q192 in the search box to learn more about \"Postpartum: Care Instructions. \" Current as of: February 11, 2020               Content Version: 12.6 © 8082-7889 [employer]. Care instructions adapted under license by [employer] (which disclaims liability or warranty for this information). If you have questions about a medical condition or this instruction, always ask your healthcare professional. [place] [address] any warranty or liability for your use of this information.

## 2021-01-15 ENCOUNTER — OFFICE VISIT (OUTPATIENT)
Dept: OBGYN CLINIC | Age: 26
End: 2021-01-15
Payer: COMMERCIAL

## 2021-01-15 VITALS
WEIGHT: 120 LBS | HEIGHT: 65 IN | SYSTOLIC BLOOD PRESSURE: 96 MMHG | BODY MASS INDEX: 19.99 KG/M2 | DIASTOLIC BLOOD PRESSURE: 62 MMHG

## 2021-01-15 DIAGNOSIS — Z30.430 ENCOUNTER FOR IUD INSERTION: Primary | ICD-10-CM

## 2021-01-15 LAB
HCG URINE, QL. (POC): NEGATIVE
VALID INTERNAL CONTROL?: YES

## 2021-01-15 PROCEDURE — 81025 URINE PREGNANCY TEST: CPT | Performed by: OBSTETRICS & GYNECOLOGY

## 2021-01-15 PROCEDURE — 58300 INSERT INTRAUTERINE DEVICE: CPT | Performed by: OBSTETRICS & GYNECOLOGY

## 2021-01-15 RX ORDER — LEVONORGESTREL 52 MG/1
1 INTRAUTERINE DEVICE INTRAUTERINE ONCE
Qty: 1 DEVICE | Refills: 0
Start: 2021-01-15 | End: 2021-01-15

## 2021-01-15 NOTE — PROGRESS NOTES
[facility]  OFFICE PROCEDURE PROGRESS NOTE        Chart reviewed for the following:   I, Rosanna Pizano, have reviewed the History, Physical and updated the Allergic reactions for Rossberg performed immediately prior to start of procedure:   Chet Childress, have performed the following reviews on Jaleel Reaves prior to the start of the procedure:            * Patient was identified by name and date of birth   * Agreement on procedure being performed was verified  * Risks and Benefits explained to the patient  * Procedure site verified and marked as necessary  * Patient was positioned for comfort  * Consent was signed and verified     Time: 4:04pm      Date of procedure: 1/15/2021    Procedure performed by:  Dave Colon MD    How tolerated by patient: tolerated the procedure well with no complications    Post Procedural Pain Scale: 2 - Hurts Little Bit    Comments: none      -----------------------------------IUD INSERTION----------------------------------------- Indications:  Jaleel Reaves is a ,  26 y.o. female [facility] whose Patient's last menstrual period was 2021 (approximate). was on 2021 and was normal in duration and amount of flow. who presents for insertion of an IUD. The risks, benefits and alternatives of IUD insertion were discussed in detail at last visit. She also has reviewed Mirena information. She has elected to proceed with the insertion today and she states she has no further questions. A urine pregnancy test was negative     Procedure: The pelvic exam revealed normal external genitalia. On bimanual exam the uterus was retroverted and normal in size with no tenderness present. A speculum was inserted into the vagina and the cervix was visualized. The cervix was prepped with zephiran solution. The anterior lip of the cervix was grasped with a single toothed tenaculum.  The uterus was sounded with a Warren sound to 7 centimeters. A Mirena was then inserted without difficulty. The string was cut to 3 centimeters. She experienced a mild  amount of cramping. Post Procedure Status: She tolerated the procedure with mild discomfort. The patient was observed for 10 minutes after the insertion. There were no complications. Patient was discharged in stable condition. The patient received Mirena lot number [id number].     Disc bleeding, expulsion  Fu in 4 weeks with US

## 2021-02-23 ENCOUNTER — OFFICE VISIT (OUTPATIENT)
Dept: OBGYN CLINIC | Age: 26
End: 2021-02-23

## 2021-02-23 VITALS
BODY MASS INDEX: 19.49 KG/M2 | DIASTOLIC BLOOD PRESSURE: 74 MMHG | HEIGHT: 65 IN | WEIGHT: 117 LBS | SYSTOLIC BLOOD PRESSURE: 118 MMHG

## 2021-02-23 DIAGNOSIS — Z30.431 IUD CHECK UP: Primary | ICD-10-CM

## 2021-02-23 PROCEDURE — 99213 OFFICE O/P EST LOW 20 MIN: CPT | Performed by: OBSTETRICS & GYNECOLOGY

## 2021-02-23 NOTE — PROGRESS NOTES
This is a follow-up visit for Ramin Perez is a [id number] [phone],  23 y.o. female WHITE whose No LMP recorded. (Menstrual status: IUD). was on . She had an Mirena IUD placed six weeks ago. Since the IUD placement, the patient has not had any unusual complaints. She has had some mild non-menstrual bleeding. She describes having a moderate amount of blood-tinged discharge which has occurred off and on since insertion of the Mirena. She has not had any significant  pain. She has had no fever. Associated signs and symptoms: she denies dyspareunia, expulsion, heavy bleeding, increased pain, fever, and pelvic pain. Ultrasound was done today and revealed appropriate placement of the IUD in the endometrial cavity. TRANSVAGINAL ULTRASOUND PERFORMED  UTERUS IS RETROVERTED, NORMAL IN SIZE AND ECHOGENICITY. ENDOMETRIUM MEASURES 2MM IN THICKNESS. NO EVIDENCE OF MASSES OR ABNORMALITIES ARE SEEN. IUD IS SEEN IN THE PROPER POSITION WITHIN THE ENDOMETRIAL CAVITY IN THE UTERINE FUNDUS. RIGHT OVARY APPEARS WITHIN NORMAL LIMITS. LEFT OVARY APPEARS WITHIN NORMAL LIMITS. FREE FLUID SEEN IN THE CDS. Past Medical History:   Diagnosis Date    Anemia     Herpes simplex virus (HSV) infection     Migraines      No past surgical history on file. Social History     Occupational History    Not on file   Tobacco Use    Smoking status: Never Smoker    Smokeless tobacco: Never Used   Substance and Sexual Activity    Alcohol use: Not Currently    Drug use: Never    Sexual activity: Yes     Partners: Male     No family history on file. Allergies   Allergen Reactions    Zyrtec [Cetirizine] Hives     Prior to Admission medications    Medication Sig Start Date End Date Taking? Authorizing Provider   AMBULATORY BREAST PUMP Dispense 1 electric breast pump. Use as directed. 10/16/20  Yes Eugenie Boeck, MD   PNV No12-Iron-FA-DSS-OM-3 29 mg iron-1 mg -50 mg CPKD Take  by mouth.    Yes Provider, Historical   ibuprofen (MOTRIN) 600 mg tablet Take 1 Tab by mouth every six (6) hours as needed for Pain. Take with food. 11/29/20   Cecy Sandoval MD   ferrous sulfate (Slow Fe) 142 mg (45 mg iron) ER tablet Take 142 mg by mouth Daily (before breakfast). Provider, Historical   acetaminophen (TylenoL) 325 mg tablet Take 1,000 mg by mouth every four (4) hours as needed for Pain. Provider, Historical   butalbital-acetaminophen-caffeine (FIORICET, ESGIC) -40 mg per tablet Take 1 Tab by mouth every four (4) hours as needed for Headache.  11/19/20   Aneesh Duffy MD        Review of Systems: History obtained from the patient  Constitutional: negative for weight loss, fever, night sweats  Breast: negative for breast lumps, nipple discharge, galactorrhea  GI: negative for change in bowel habits, abdominal pain, black or bloody stools  : negative for frequency, dysuria, hematuria, vaginal discharge  MSK: negative for back pain, joint pain, muscle pain  Skin: negative for itching, rash, hives  Psych: negative for anxiety, depression, change in mood      Objective:  Visit Vitals  /74   Ht 5' 5\" (1.651 m)   Wt 117 lb (53.1 kg)   BMI 19.47 kg/m²       Physical Exam:   PHYSICAL EXAMINATION    Constitutional  · Appearance: well-nourished, well developed, alert, in no acute distress    Gastrointestinal  · Abdominal Examination: abdomen non-tender to palpation, normal bowel sounds, no masses present  · Liver and spleen: no hepatomegaly present, spleen not palpable  · Hernias: no hernias identified    Genitourinary  · External Genitalia: normal appearance for age, no discharge present, no tenderness present, no inflammatory lesions present, no masses present, no atrophy present  · Vagina: normal vaginal vault without central or paravaginal defects, no discharge present, no inflammatory lesions present, no masses present  · Bladder: non-tender to palpation  · Urethra: appears normal  · Cervix: normal with IUD string visible and appropriate length   · Uterus: normal size, shape and consistency  · Adnexa: no adnexal tenderness present, no adnexal masses present  · Perineum: perineum within normal limits, no evidence of trauma, no rashes or skin lesions present    Skin  · General Inspection: no rash, no lesions identified    Neurologic/Psychiatric  · Mental Status:  · Orientation: grossly oriented to person, place and time  · Mood and Affect: mood normal, affect appropriate    Assessment:   Doing well with IUD    Plan:   AE

## 2021-03-08 PROBLEM — O60.03 PRETERM LABOR WITHOUT DELIVERY, THIRD TRIMESTER: Status: RESOLVED | Noted: 2020-11-25 | Resolved: 2021-03-08

## 2021-03-08 PROBLEM — Z3A.36 36 WEEKS GESTATION OF PREGNANCY: Status: RESOLVED | Noted: 2020-11-25 | Resolved: 2021-03-08

## 2021-03-08 RX ORDER — ESCITALOPRAM OXALATE 10 MG/1
10 TABLET ORAL DAILY
Qty: 90 TAB | Refills: 4 | Status: SHIPPED | OUTPATIENT
Start: 2021-03-08 | End: 2022-04-01

## 2021-06-01 ENCOUNTER — OFFICE VISIT (OUTPATIENT)
Dept: OBGYN CLINIC | Age: 26
End: 2021-06-01
Payer: COMMERCIAL

## 2021-06-01 VITALS
BODY MASS INDEX: 19.49 KG/M2 | HEIGHT: 65 IN | DIASTOLIC BLOOD PRESSURE: 68 MMHG | SYSTOLIC BLOOD PRESSURE: 117 MMHG | WEIGHT: 117 LBS

## 2021-06-01 DIAGNOSIS — Z01.419 ENCOUNTER FOR GYNECOLOGICAL EXAMINATION (GENERAL) (ROUTINE) WITHOUT ABNORMAL FINDINGS: Primary | ICD-10-CM

## 2021-06-01 PROCEDURE — 99395 PREV VISIT EST AGE 18-39: CPT | Performed by: OBSTETRICS & GYNECOLOGY

## 2021-06-01 PROCEDURE — 58301 REMOVE INTRAUTERINE DEVICE: CPT | Performed by: OBSTETRICS & GYNECOLOGY

## 2021-06-01 NOTE — PROGRESS NOTES
Natalie Cruz is a ,  23 y.o. female WHITE whose No LMP recorded. (Menstrual status: IUD). was on  who presents for her annual checkup. She is having no significant problems. She requests removal of her IUD. With regard to the Gardisil vaccine, she has received all 3 injections. Menstrual status:    Her periods are minimal to nonexistent in flow. She is using essentially none pads or tampons per day, minimal to none using Mirena. She denies dysmenorrhea. She reports no premenstrual symptoms. Contraception:    The current method of family planning is IUD and She declines contraception and counseling. Sexual history:    She  reports being sexually active and has had partner(s) who are Male. Medical conditions:    Since her last annual GYN exam about 20 ago, she has not the following changes in her health history: none. Pap and Mammogram History:    Her most recent Pap smear was normal/-HPV obtained 20 year(s) ago. The patient has never had a mammogram.    The patient does not have a family history of breast cancer. Past Medical History:   Diagnosis Date    Anemia     Herpes simplex virus (HSV) infection     IUD (intrauterine device) in place 01/15/2021    Mirena placed    Migraines      No past surgical history on file. Current Outpatient Medications   Medication Sig Dispense Refill    escitalopram oxalate (LEXAPRO) 10 mg tablet Take 1 Tab by mouth daily. 90 Tab 4    ibuprofen (MOTRIN) 600 mg tablet Take 1 Tab by mouth every six (6) hours as needed for Pain. Take with food. (Patient not taking: Reported on 2021) 30 Tab 0    ferrous sulfate (Slow Fe) 142 mg (45 mg iron) ER tablet Take 142 mg by mouth Daily (before breakfast). (Patient not taking: Reported on 2021)      acetaminophen (TylenoL) 325 mg tablet Take 1,000 mg by mouth every four (4) hours as needed for Pain.  (Patient not taking: Reported on 2021)      butalbital-acetaminophen-caffeine (FIORICET, ESGIC) -40 mg per tablet Take 1 Tab by mouth every four (4) hours as needed for Headache. (Patient not taking: Reported on 6/1/2021) 30 Tab 0    AMBULATORY BREAST PUMP Dispense 1 electric breast pump. Use as directed. (Patient not taking: Reported on 6/1/2021) 1 Pump 0    PNV No12-Iron-FA-DSS-OM-3 29 mg iron-1 mg -50 mg CPKD Take  by mouth. (Patient not taking: Reported on 6/1/2021)       Allergies: Zyrtec [cetirizine]   Social History     Socioeconomic History    Marital status:      Spouse name: Not on file    Number of children: Not on file    Years of education: Not on file    Highest education level: Not on file   Occupational History    Not on file   Tobacco Use    Smoking status: Never Smoker    Smokeless tobacco: Never Used   Substance and Sexual Activity    Alcohol use: Not Currently    Drug use: Never    Sexual activity: Yes     Partners: Male   Other Topics Concern     Service Not Asked    Blood Transfusions Not Asked    Caffeine Concern Not Asked    Occupational Exposure Not Asked    Hobby Hazards Not Asked    Sleep Concern Not Asked    Stress Concern Not Asked    Weight Concern Not Asked    Special Diet Not Asked    Back Care Not Asked    Exercise Not Asked    Bike Helmet Not Asked   [address] Not Asked    Self-Exams Not Asked   Social History Narrative    Not on file     Social Determinants of Health     Financial Resource Strain:     Difficulty of Paying Living Expenses:    Food Insecurity:     Worried About Running Out of Food in the Last Year:     Ran Out of Food in the Last Year:    Transportation Needs:     Lack of Transportation (Medical):      Lack of Transportation (Non-Medical):    Physical Activity:     Days of Exercise per Week:     Minutes of Exercise per Session:    Stress:     Feeling of Stress :    Social Connections:     Frequency of Communication with Friends and Family:     Frequency of Social Gatherings with Friends and Family:     Attends Holiness Services:     Active Member of Clubs or Organizations:     Attends Club or Organization Meetings:     Marital Status:    Intimate Partner Violence:     Fear of Current or Ex-Partner:     Emotionally Abused:     Physically Abused:     Sexually Abused: Tobacco History:  reports that she has never smoked. She has never used smokeless tobacco.  Alcohol Abuse:  reports previous alcohol use. Drug Abuse:  reports no history of drug use.     Patient Active Problem List   Diagnosis Code   (none) - all problems resolved or deleted       Review of Systems - History obtained from the patient  Constitutional: negative for weight loss, fever, night sweats  HEENT: negative for hearing loss, earache, congestion, snoring, sorethroat  CV: negative for chest pain, palpitations, edema  Resp: negative for cough, shortness of breath, wheezing  GI: negative for change in bowel habits, abdominal pain, black or bloody stools  : negative for frequency, dysuria, hematuria, vaginal discharge  MSK: negative for back pain, joint pain, muscle pain  Breast: negative for breast lumps, nipple discharge, galactorrhea  Skin :negative for itching, rash, hives  Neuro: negative for dizziness, headache, confusion, weakness  Psych: negative for anxiety, depression, change in mood  Heme/lymph: negative for bleeding, bruising, pallor    Physical Exam    Visit Vitals  Ht 5' 5\" (1.651 m)   Wt 117 lb (53.1 kg)   BMI 19.47 kg/m²       Constitutional  · Appearance: well-nourished, well developed, alert, in no acute distress    HENT  · Head and Face: appears normal    Neck  · Inspection/Palpation: normal appearance, no masses or tenderness  · Lymph Nodes: no lymphadenopathy present  · Thyroid: gland size normal, nontender, no nodules or masses present on palpation    Chest  · Respiratory Effort: breathing normal  · Auscultation: normal breath sounds    Cardiovascular  · Heart:  · Auscultation: regular rate and rhythm without murmur    Breasts  · Inspection of Breasts: breasts symmetrical, no skin changes, no discharge present, nipple appearance normal, no skin retraction present  · Palpation of Breasts and Axillae: no masses present on palpation, no breast tenderness  · Axillary Lymph Nodes: no lymphadenopathy present    Gastrointestinal  · Abdominal Examination: abdomen non-tender to palpation, normal bowel sounds, no masses present  · Liver and spleen: no hepatomegaly present, spleen not palpable  · Hernias: no hernias identified    Genitourinary  · External Genitalia: normal appearance for age, no discharge present, no tenderness present, no inflammatory lesions present, no masses present, no atrophy present  · Vagina: normal vaginal vault without central or paravaginal defects, no discharge present, no inflammatory lesions present, no masses present  · Bladder: non-tender to palpation  · Urethra: appears normal  · Cervix: normal   · Uterus: normal size, shape and consistency  · Adnexa: no adnexal tenderness present, no adnexal masses present  · Perineum: perineum within normal limits, no evidence of trauma, no rashes or skin lesions present  · Anus: anus within normal limits, no hemorrhoids present  · Inguinal Lymph Nodes: no lymphadenopathy present    Skin  · General Inspection: no rash, no lesions identified    Neurologic/Psychiatric  · Mental Status:  · Orientation: grossly oriented to person, place and time  · Mood and Affect: mood normal, affect appropriate    . Assessment:  Routine gynecologic examination  Her current medical status is satisfactory with no evidence of significant gynecologic issues.     Plan:  Counseled re: diet, exercise, healthy lifestyle  Return for yearly wellness visits  IUD removed, start vits - rec covid vaccine     [facility]  OFFICE PROCEDURE PROGRESS NOTE        Chart reviewed for the following:   I, Shreyas Angela LPN, have reviewed the History, Physical and updated the Allergic reactions for Yovany performed immediately prior to start of procedure:   Stefania Tovar LPN, have performed the following reviews on Allyson Richard prior to the start of the procedure:            * Patient was identified by name and date of birth   * Agreement on procedure being performed was verified  * Risks and Benefits explained to the patient  * Procedure site verified and marked as necessary  * Patient was positioned for comfort  * Consent was signed and verified     Time: 12:09 PM        Date of procedure: 2021    Procedure performed by:  Abbey Pickens MD    How tolerated by patient: tolerated the procedure well with no complications    Post Procedural Pain Scale: 2 - Hurts Little Bit    Comments: none                -----------------------------------IUD REMOVAL---------------------  Indications for Removal:  Allyson Richard is a ,  23 y.o. female WHITE whose No LMP recorded. (Menstrual status: IUD). was on . who presents today for IUD removal. Her current IUD was placed 6 months ago. She has not had any problems with the IUD. She requests replacement of the IUD because she would like to try and conceive. Procedure: The patient was placed in a dorsal lithotomy position and appropriately draped. On bimanual exam the uterus was anterior and normal in size with no tenderness present. A speculum exam was performed and the cervix was visualized. The cervix was prepped with zephiran solution. The IUD string was visualized. Using ring forceps , the string was grasped and the IUD removed intact. The IUD was shown to the patient.

## 2021-09-13 ENCOUNTER — ROUTINE PRENATAL (OUTPATIENT)
Dept: OBGYN CLINIC | Age: 26
End: 2021-09-13

## 2021-09-13 VITALS
SYSTOLIC BLOOD PRESSURE: 109 MMHG | WEIGHT: 126 LBS | HEIGHT: 65 IN | BODY MASS INDEX: 20.99 KG/M2 | DIASTOLIC BLOOD PRESSURE: 61 MMHG

## 2021-09-13 DIAGNOSIS — Z34.80 SUPERVISION OF OTHER NORMAL PREGNANCY, ANTEPARTUM: Primary | ICD-10-CM

## 2021-09-13 DIAGNOSIS — Z34.81 ENCOUNTER FOR SUPERVISION OF OTHER NORMAL PREGNANCY, FIRST TRIMESTER: ICD-10-CM

## 2021-09-13 PROCEDURE — 0502F SUBSEQUENT PRENATAL CARE: CPT | Performed by: OBSTETRICS & GYNECOLOGY

## 2021-09-13 RX ORDER — ONDANSETRON 8 MG/1
8 TABLET, ORALLY DISINTEGRATING ORAL
Qty: 30 TABLET | Refills: 5 | Status: SHIPPED | OUTPATIENT
Start: 2021-09-13 | End: 2022-05-27

## 2021-09-13 RX ORDER — DOXYLAMINE SUCCINATE AND PYRIDOXINE HYDROCHLORIDE 20; 20 MG/1; MG/1
1 TABLET, EXTENDED RELEASE ORAL 2 TIMES DAILY
Qty: 60 TABLET | Refills: 6 | Status: SHIPPED | OUTPATIENT
Start: 2021-09-13 | End: 2022-05-27

## 2021-09-13 NOTE — PROGRESS NOTES
Current pregnancy history:    Yamilet Urbina is a 23 y.o. female who presents for the evaluation of pregnancy. Patient's last menstrual period was 07/17/2021. LMP history:  The date of her LMP is  certain. Her menstrual cycles are regular and occur approximately every 28 days and range from 3 to 5 days. The last menses did  last the usual number of days. A urine pregnancy test was positive 4 weeks ago. She was not on the pill at conception. Based on her LMP her EGA is 8 weeks and 2 days giving an [address] of 4/23/2022. Ultrasound data:  She had an ultrasound done by the ultrasound tech today which revealed a viable dunn pregnancy with a gestational age of 9 weeks and 5 days giving an EDC of 4/27/22. Ultrasound details:    TA ULTRASOUND PERFORMED  A SINGLE VIABLE 7W5D IUP IS SEEN WITH NORMAL CARDIAC RHYTHM. GESTATIONAL AGE BASED ON TODAYS ULTRASOUND. A NORMAL YOLK SAC IS SEEN. RIGHT OVARY APPEARS WITHIN NORMAL LIMITS. LEFT OVARY APPEARS WITHIN NORMAL LIMITS. NO FREE FLUID SEEN IN THE CDS. Pregnancy symptoms:    Since her LMP she has experienced  urinary frequency, breast tenderness, fatigue and nausea. She has not been vomiting over the last few weeks. Associated signs and symptoms which she denies: dysuria, discharge, vaginal bleeding. She states she has gained weight:  Approximately 2 pounds over the last few weeks. Relevant past pregnancy history:  She has the following pregnancy history:   Horizon- negative  Varicella and parvo immune- 5/28/20  Failed 1hr- 140 3hr, wnl    Relevant past medical history:(relevant to this pregnancy): noncontributory. 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 Positive for nothing. Exposure history: There is/are no indoor cat/s in the home. She admits close contact with children on a regular basis. She has had chicken pox or the vaccine in the past.   Patient denies issues with domestic violence. Genetic Screening/Teratology Counseling: (Includes patient, baby's father, or anyone in either family with:)  3.  Patient's age >/= 28 at [facility]?-- no  .   2. Thalassemia ([facility], [place], [address], or  background): MCV<80?--no.     3.  Neural tube defect (meningomyelocele, spina bifida, anencephaly)?--no.   4.  Congenital heart defect?--no.  5.  Down syndrome?--no.   6.  Adrian-Sachs (Holiness, [place])?--no.   7.  Canavan's Disease?--no.   8.  Familial Dysautonomia?--no.   9.  Sickle cell disease or trait ()? --no   The patient has not been tested for sickle trait  10. Hemophilia or other blood disorders?--no. 11.  Muscular dystrophy?--no. 12.  Cystic fibrosis?--no. 13.  Talbot's Chorea?--no. 14.  Mental retardation/autism (if yes was person tested for Fragile X)?--no. 15.  Other inherited genetic or chromosomal disorder?--no. 12.  Maternal metabolic disorder (DM, PKU, etc)?--no. 17.  Patient or FOB with a child with a birth defect not listed above?--no.  17a. Patient or FOB with a birth defect themselves?--no. 18.  Recurrent pregnancy loss, or stillbirth?--no. 19.  Any medications since LMP other than prenatal vitamins (include vitamins,  supplements, OTC meds, drugs, alcohol)?--no. 20.  Any other genetic/environmental exposure to discuss?--no. Infection History:  1. Lives with someone with TB or TB exposed?--no.   2.  Patient or partner has history of genital herpes?--no.  3.  Rash or viral illness since LMP?--no.    4.  History of STD (GC, CT, HPV, syphilis, HIV)? --no   5. Other: OTHER? Past Medical History:   Diagnosis Date    Anemia     Herpes simplex virus (HSV) infection     IUD (intrauterine device) in place 01/15/2021    Mirena placed    Migraines      No past surgical history on file.   Social History     Occupational History    Not on file   Tobacco Use    Smoking status: Never Smoker    Smokeless tobacco: Never Used   Substance and Sexual Activity    Alcohol use: Not Currently    Drug use: Never    Sexual activity: Yes     Partners: Male     No family history on file. Allergies   Allergen Reactions    Zyrtec [Cetirizine] Hives     Prior to Admission medications    Medication Sig Start Date End Date Taking? Authorizing Provider   escitalopram oxalate (LEXAPRO) 10 mg tablet Take 1 Tab by mouth daily. 3/8/21  Yes Kamari Rodriguez MD   ibuprofen (MOTRIN) 600 mg tablet Take 1 Tab by mouth every six (6) hours as needed for Pain. Take with food. Patient not taking: Reported on 6/1/2021 11/29/20   Niharika Tate MD   ferrous sulfate (Slow Fe) 142 mg (45 mg iron) ER tablet Take 142 mg by mouth Daily (before breakfast). Patient not taking: Reported on 6/1/2021    Provider, Historical   acetaminophen (TylenoL) 325 mg tablet Take 1,000 mg by mouth every four (4) hours as needed for Pain. Patient not taking: Reported on 6/1/2021    Provider, Historical   butalbital-acetaminophen-caffeine (FIORICET, ESGIC) -40 mg per tablet Take 1 Tab by mouth every four (4) hours as needed for Headache. Patient not taking: Reported on 6/1/2021 11/19/20   Kamari Rodriguez MD   AMBULATORY BREAST PUMP Dispense 1 electric breast pump. Use as directed. Patient not taking: Reported on 6/1/2021 10/16/20   Kamari Rodriguez MD   PNV No12-Iron-FA-DSS-OM-3 29 mg iron-1 mg -50 mg CPKD Take  by mouth.   Patient not taking: Reported on 6/1/2021    Provider, Historical        Review of Systems: History obtained from the patient  Constitutional: negative for weight loss, fever, night sweats  HEENT: negative for hearing loss, earache, congestion, snoring, sorethroat  CV: negative for chest pain, palpitations, edema  Resp: negative for cough, shortness of breath, wheezing  Breast: negative for breast lumps, nipple discharge, galactorrhea  GI: negative for change in bowel habits, abdominal pain, black or bloody stools  : negative for frequency, dysuria, hematuria, vaginal discharge  MSK: negative for back pain, joint pain, muscle pain  Skin: negative for itching, rash, hives  Neuro: negative for dizziness, headache, confusion, weakness  Psych: negative for anxiety, depression, change in mood  Heme/lymph: negative for bleeding, bruising, pallor    Objective:  Visit Vitals  /61   Ht 5' 5\" (1.651 m)   Wt 126 lb (57.2 kg)   LMP 07/17/2021   BMI 20.97 kg/m²       Physical Exam:   PHYSICAL EXAMINATION    Constitutional  · Appearance: well-nourished, well developed, alert, in no acute distress    HENT  · Head  · Face: appears normal  · Eyes: appear normal  · Ears: normal  · Mouth: normal  · Lips: no lesions    Neck  · Inspection/Palpation: normal appearance, no masses or tenderness  · Lymph Nodes: no lymphadenopathy present  · Thyroid: gland size normal, nontender, no nodules or masses present on palpation    Chest  · Respiratory Effort: breathing unlabored  · Auscultation: normal breath sounds    Cardiovascular  · Heart:  · Auscultation: regular rate and rhythm without murmur    Breasts  · Inspection of Breasts: breasts symmetrical, no skin changes, no discharge present, nipple appearance normal, no skin retraction present  · Palpation of Breasts and Axillae: no masses present on palpation, no breast tenderness  · Axillary Lymph Nodes: no lymphadenopathy present    Gastrointestinal  · Abdominal Examination: abdomen non-tender to palpation, normal bowel sounds, no masses present  · Liver and spleen: no hepatomegaly present, spleen not palpable  · Hernias: no hernias identified    Genitourinary  · External Genitalia: normal appearance for age, no discharge present, no tenderness present, no inflammatory lesions present, no masses present, no atrophy present  · Vagina: normal vaginal vault without central or paravaginal defects, no discharge present, no inflammatory lesions present, no masses present  · Bladder: non-tender to palpation  · Urethra: appears normal  · Cervix: normal   · Uterus: enlarged, normal shape, soft  · Adnexa: no adnexal tenderness present, no adnexal masses present  · Perineum: perineum within normal limits, no evidence of trauma, no rashes or skin lesions present  · Anus: anus within normal limits, no hemorrhoids present  · Inguinal Lymph Nodes: no lymphadenopathy present    Skin  · General Inspection: no rash, no lesions identified    Neurologic/Psychiatric  · Mental Status:  · Orientation: grossly oriented to person, place and time  · Mood and Affect: mood normal, affect appropriate    Assessment:   Intrauterine pregnancy with the following problems identified:   [facility] 2022 D=US  Parvo/varicella immune  Horizon neg  Hx of 36 week 3d  PTB - presented with labor  Declines covid vaccine      Plan:     Offered CF testing, CVS, Nuchal Translucency, MSAFP, amnio, and discussed NIPT  Course of pregnancy discussed including visit schedule, routine U/S, glucola testing, etc.  Avoid alcoholic beverages and illicit/recreational drugs use  Take prenatal vitamins or folic acid daily. Hospital and practice style discussed with coverage system. Discussed nutrition, toxoplasmosis precautions, sexual activity, exercise, need for influenza vaccine, environmental and work hazards, travel advice, screen for domestic violence, need for seat belts. Discussed seafood, unpasteurized dairy products, deli meat, artificial sweeteners, and caffeine. Information on prenatal classes/breastfeeding given. Information on circumcision given  Patient encouraged not to smoke. Discussed current prescription drug use. Given medication list.  Discussed the use of over the counter medications and chemicals. Route of delivery discussed, including risks, benefits, and alternatives of  versus repeat LTCS.   Pt understands risk of hemorrhage during pregnancy and post delivery and would accept blood products if necessary in life-threatening emergencies      Handouts given to pt.

## 2021-09-13 NOTE — PROGRESS NOTES
Current pregnancy history:    Adrianna Pompa is a 23 y.o. female who presents for the evaluation of pregnancy. No LMP recorded. (Menstrual status: IUD). LMP history:  The date of her LMP is  certain. Her last menstrual period was normal and lasted for 4 to 5 days. A urine pregnancy test was positive *** weeks ago. She was not on the pill at conception. Based on her LMP, her EDC is 2022 and her EGA is 8 weeks,2 days. Her menstrual cycles are regular and occur approximately every 28 days  and range from 3 to 5 days. The last menses lasted *** the usual number of days. Ultrasound data:  She had an  ultrasound done by the ultrasound tech today which revealed a viable dunn pregnancy with a gestational age of *** weeks and *** days giving an EDC of ***. Pregnancy symptoms:    Since her LMP she has experienced  urinary frequency, breast tenderness, and nausea. She {HAS/HAS NOT:} been vomiting over the last few weeks. Associated signs and symptoms which she denies: dysuria, discharge, vaginal bleeding. She states she has *** gained weight:  Approximately *** pounds over the last few weeks. Relevant past pregnancy history:   She has the following pregnancy history: Second pregnancy. Her last pregnancy was uncomplicated. She has no history of  delivery. Relevant past medical history:(relevant to this pregnancy): noncontributory. Pap/Occupational history:  Last pap smear: 2020    Her occupation is: ***. Substance history: negative for alcohol, tobacco and street drugs. Positive for nothing. ***  Exposure history: There is/are no indoor cat/s in the home. The patient was instructed to not change the cat litter. She admits close contact with children on a regular basis. She has had chicken pox or the vaccine in the past.   Patient denies issues with domestic violence.      Genetic Screening/Teratology Counseling: (Includes patient, baby's father, or anyone in either family with:)  1.  Patient's age >/= 28 at [facility]?-- no  .   2. Thalassemia ([place], [place], [address], or  background): MCV<80?--no.     3.  Neural tube defect (meningomyelocele, spina bifida, anencephaly)?--no.   4.  Congenital heart defect?--no.  5.  Down syndrome?--no.   6.  Adrian-Sachs (Yazidism, [place])?--no.   7.  Canavan's Disease?--no.   8.  Familial Dysautonomia?--no.   9.  Sickle cell disease or trait ()? --no   The patient has not been tested for sickle trait  10. Hemophilia or other blood disorders?--no. 11.  Muscular dystrophy?--no. 12.  Cystic fibrosis?--no. 13.  Moulton's Chorea?--no. 14.  Mental retardation/autism (if yes was person tested for Fragile X)?--no. 15.  Other inherited genetic or chromosomal disorder?--no. 12.  Maternal metabolic disorder (DM, PKU, etc)?--no. 17.  Patient or FOB with a child with a birth defect not listed above?--no.  17a. Patient or FOB with a birth defect themselves?--no. 18.  Recurrent pregnancy loss, or stillbirth?--no. 19.  Any medications since LMP other than prenatal vitamins (include vitamins,  supplements, OTC meds, drugs, alcohol)?--no. 20.  Any other genetic/environmental exposure to discuss?--no. Infection History:  1. Lives with someone with TB or TB exposed?--no.   2.  Patient or partner has history of genital herpes?--no.  3.  Rash or viral illness since LMP?--no.    4.  History of STD (GC, CT, HPV, syphilis, HIV)? --no   5. Other: OTHER? Past Medical History:   Diagnosis Date    Anemia     Herpes simplex virus (HSV) infection     IUD (intrauterine device) in place 01/15/2021    Mirena placed    Migraines      No past surgical history on file.   Social History     Occupational History    Not on file   Tobacco Use    Smoking status: Never Smoker    Smokeless tobacco: Never Used   Substance and Sexual Activity    Alcohol use: Not Currently    Drug use: Never    Sexual activity: Yes     Partners: Male     No family history on file. Allergies   Allergen Reactions    Zyrtec [Cetirizine] Hives     Prior to Admission medications    Medication Sig Start Date End Date Taking? Authorizing Provider   escitalopram oxalate (LEXAPRO) 10 mg tablet Take 1 Tab by mouth daily. 3/8/21   Norma Iverson MD   ibuprofen (MOTRIN) 600 mg tablet Take 1 Tab by mouth every six (6) hours as needed for Pain. Take with food. Patient not taking: Reported on 6/1/2021 11/29/20   Sena Bishop MD   ferrous sulfate (Slow Fe) 142 mg (45 mg iron) ER tablet Take 142 mg by mouth Daily (before breakfast). Patient not taking: Reported on 6/1/2021    Provider, Historical   acetaminophen (TylenoL) 325 mg tablet Take 1,000 mg by mouth every four (4) hours as needed for Pain. Patient not taking: Reported on 6/1/2021    Provider, Historical   butalbital-acetaminophen-caffeine (FIORICET, ESGIC) -40 mg per tablet Take 1 Tab by mouth every four (4) hours as needed for Headache. Patient not taking: Reported on 6/1/2021 11/19/20   Norma Iverson MD   AMBULATORY BREAST PUMP Dispense 1 electric breast pump. Use as directed. Patient not taking: Reported on 6/1/2021 10/16/20   Norma Iverson MD   PNV No12-Iron-FA-DSS-OM-3 29 mg iron-1 mg -50 mg CPKD Take  by mouth.   Patient not taking: Reported on 6/1/2021    Provider, Historical        Review of Systems: History obtained from the patient  Constitutional: negative for weight loss, fever, night sweats  HEENT: negative for hearing loss, earache, congestion, snoring, sorethroat  CV: negative for chest pain, palpitations, edema  Resp: negative for cough, shortness of breath, wheezing  Breast: negative for breast lumps, nipple discharge, galactorrhea  GI: negative for change in bowel habits, abdominal pain, black or bloody stools  : negative for frequency, dysuria, hematuria, vaginal discharge  MSK: negative for back pain, joint pain, muscle pain  Skin: negative for itching, rash, hives  Neuro: negative for dizziness, headache, confusion, weakness  Psych: negative for anxiety, depression, change in mood  Heme/lymph: negative for bleeding, bruising, pallor    Objective: There were no vitals taken for this visit.     Physical Exam:   PHYSICAL EXAMINATION    Constitutional  · Appearance: well-nourished, well developed, alert, in no acute distress    HENT  · Head  · Face: appears normal  · Eyes: appear normal  · Ears: normal  · Mouth: normal  · Lips: no lesions    Neck  · Inspection/Palpation: normal appearance, no masses or tenderness  · Lymph Nodes: no lymphadenopathy present  · Thyroid: gland size normal, nontender, no nodules or masses present on palpation    Chest  · Respiratory Effort: breathing unlabored  · Auscultation: normal breath sounds    Cardiovascular  · Heart:  · Auscultation: regular rate and rhythm without murmur    Breasts  · Inspection of Breasts: breasts symmetrical, no skin changes, no discharge present, nipple appearance normal, no skin retraction present  · Palpation of Breasts and Axillae: no masses present on palpation, no breast tenderness  · Axillary Lymph Nodes: no lymphadenopathy present    Gastrointestinal  · Abdominal Examination: abdomen non-tender to palpation, normal bowel sounds, no masses present  · Liver and spleen: no hepatomegaly present, spleen not palpable  · Hernias: no hernias identified    Genitourinary  · External Genitalia: normal appearance for age, no discharge present, no tenderness present, no inflammatory lesions present, no masses present, no atrophy present  · Vagina: normal vaginal vault without central or paravaginal defects, no discharge present, no inflammatory lesions present, no masses present  · Bladder: non-tender to palpation  · Urethra: appears normal  · Cervix: normal   · Uterus: enlarged, normal shape, soft  · Adnexa: no adnexal tenderness present, no adnexal masses present  · Perineum: perineum within normal limits, no evidence of trauma, no rashes or skin lesions present  · Anus: anus within normal limits, no hemorrhoids present  · Inguinal Lymph Nodes: no lymphadenopathy present    Skin  · General Inspection: no rash, no lesions identified    Neurologic/Psychiatric  · Mental Status:  · Orientation: grossly oriented to person, place and time  · Mood and Affect: mood normal, affect appropriate    Assessment:   Intrauterine pregnancy with the following problems identified: ***. Plan:     Offered CF testing, CVS, Nuchal Translucency, MSAFP, amnio, and discussed NIPT  Course of pregnancy discussed including visit schedule, routine U/S, glucola testing, etc.  Avoid alcoholic beverages and illicit/recreational drugs use  Take prenatal vitamins or folic acid daily. Hospital and practice style discussed with coverage system. Discussed nutrition, toxoplasmosis precautions, sexual activity, exercise, need for influenza vaccine, environmental and work hazards, travel advice, screen for domestic violence, need for seat belts. Discussed seafood, unpasteurized dairy products, deli meat, artificial sweeteners, and caffeine. Information on prenatal classes/breastfeeding given. Information on circumcision given  Patient encouraged not to smoke. Discussed current prescription drug use. Given medication list.  Discussed the use of over the counter medications and chemicals. Route of delivery discussed, including risks, benefits, and alternatives of  versus repeat LTCS. Pt understands risk of hemorrhage during pregnancy and post delivery and would accept blood products if necessary in life-threatening emergencies      Handouts given to pt.

## 2021-09-15 LAB
BACTERIA SPEC CULT: ABNORMAL
C TRACH RRNA SPEC QL NAA+PROBE: NEGATIVE
CC UR VC: ABNORMAL
N GONORRHOEA RRNA SPEC QL NAA+PROBE: NEGATIVE
SERVICE CMNT-IMP: ABNORMAL
SPECIMEN STATUS REPORT, ROLRST: NORMAL
T VAGINALIS DNA SPEC QL NAA+PROBE: NEGATIVE

## 2021-09-15 RX ORDER — AMOXICILLIN 500 MG/1
500 CAPSULE ORAL 3 TIMES DAILY
Qty: 21 CAPSULE | Refills: 0 | Status: SHIPPED | OUTPATIENT
Start: 2021-09-15 | End: 2021-09-22

## 2021-09-16 PROBLEM — Z34.80 SUPERVISION OF OTHER NORMAL PREGNANCY, ANTEPARTUM: Status: ACTIVE | Noted: 2021-09-16

## 2021-10-13 ENCOUNTER — ROUTINE PRENATAL (OUTPATIENT)
Dept: OBGYN CLINIC | Age: 26
End: 2021-10-13
Payer: COMMERCIAL

## 2021-10-13 VITALS — SYSTOLIC BLOOD PRESSURE: 119 MMHG | WEIGHT: 130 LBS | DIASTOLIC BLOOD PRESSURE: 62 MMHG | BODY MASS INDEX: 21.63 KG/M2

## 2021-10-13 DIAGNOSIS — Z34.80 SUPERVISION OF OTHER NORMAL PREGNANCY, ANTEPARTUM: Primary | ICD-10-CM

## 2021-10-13 PROCEDURE — 90471 IMMUNIZATION ADMIN: CPT | Performed by: OBSTETRICS & GYNECOLOGY

## 2021-10-13 PROCEDURE — 0502F SUBSEQUENT PRENATAL CARE: CPT | Performed by: OBSTETRICS & GYNECOLOGY

## 2021-10-13 PROCEDURE — 90686 IIV4 VACC NO PRSV 0.5 ML IM: CPT | Performed by: OBSTETRICS & GYNECOLOGY

## 2021-10-13 NOTE — PROGRESS NOTES
Problem List  Date Reviewed: 9/13/2021        Codes Class Noted    Supervision of other normal pregnancy, antepartum ICD-10-CM: Z34.80  ICD-9-CM: V22.1  9/16/2021    Overview Addendum 9/16/2021  9:01 AM by Francisco Poole     Intrauterine pregnancy with the following problems identified:   [facility] 4/23/2022 D=US  Parvo/varicella immune  Horizon neg  Hx of 36 week 3d  PTB - presented with labor  Declines covid vaccine  GBS pos urine

## 2021-10-25 ENCOUNTER — PATIENT MESSAGE (OUTPATIENT)
Dept: OBGYN CLINIC | Age: 26
End: 2021-10-25

## 2021-11-11 ENCOUNTER — ROUTINE PRENATAL (OUTPATIENT)
Dept: OBGYN CLINIC | Age: 26
End: 2021-11-11
Payer: COMMERCIAL

## 2021-11-11 VITALS — BODY MASS INDEX: 22.13 KG/M2 | WEIGHT: 133 LBS | SYSTOLIC BLOOD PRESSURE: 116 MMHG | DIASTOLIC BLOOD PRESSURE: 67 MMHG

## 2021-11-11 DIAGNOSIS — Z34.80 SUPERVISION OF OTHER NORMAL PREGNANCY, ANTEPARTUM: Primary | ICD-10-CM

## 2021-11-11 PROCEDURE — 0502F SUBSEQUENT PRENATAL CARE: CPT | Performed by: OBSTETRICS & GYNECOLOGY

## 2021-11-11 NOTE — PROGRESS NOTES
Problem List  Date Reviewed: 10/13/2021          Codes Class Noted    Supervision of other normal pregnancy, antepartum ICD-10-CM: Z34.80  ICD-9-CM: V22.1  9/16/2021    Overview Addendum 10/25/2021  7:42 AM by Lisa Gtz     Intrauterine pregnancy with the following problems identified:   [facility] 4/23/2022 D=US  Parvo/varicella immune  Horizon neg  Hx of 36 week 3d  PTB - presented with labor  Declines covid vaccine  GBS pos urine  NIPTS- normal male

## 2021-11-13 LAB
AFP ADJ MOM SERPL: 2.16
AFP INTERP SERPL-IMP: NORMAL
AFP INTERP SERPL-IMP: NORMAL
AFP SERPL-MCNC: 79 NG/ML
AGE AT DELIVERY: 26.2 YR
COMMENT, 018013: NORMAL
GA METHOD: NORMAL
GA: 16 WEEKS
IDDM PATIENT QL: NO
MULTIPLE PREGNANCY: NO
NEURAL TUBE DEFECT RISK FETUS: 569 %
RESULTS, 017004: NORMAL

## 2021-11-29 RX ORDER — VALACYCLOVIR HYDROCHLORIDE 1 G/1
2000 TABLET, FILM COATED ORAL 2 TIMES DAILY
Qty: 4 TABLET | Refills: 0 | Status: SHIPPED | OUTPATIENT
Start: 2021-11-29 | End: 2022-05-27

## 2021-12-09 ENCOUNTER — ROUTINE PRENATAL (OUTPATIENT)
Dept: OBGYN CLINIC | Age: 26
End: 2021-12-09

## 2021-12-09 VITALS — BODY MASS INDEX: 21.97 KG/M2 | WEIGHT: 132 LBS | SYSTOLIC BLOOD PRESSURE: 100 MMHG | DIASTOLIC BLOOD PRESSURE: 54 MMHG

## 2021-12-09 DIAGNOSIS — Z34.80 SUPERVISION OF OTHER NORMAL PREGNANCY, ANTEPARTUM: Primary | ICD-10-CM

## 2021-12-09 PROCEDURE — 0502F SUBSEQUENT PRENATAL CARE: CPT | Performed by: OBSTETRICS & GYNECOLOGY

## 2021-12-09 NOTE — PROGRESS NOTES
Problem List  Date Reviewed: 11/11/2021          Codes Class Noted    Supervision of other normal pregnancy, antepartum ICD-10-CM: Z34.80  ICD-9-CM: V22.1  9/16/2021    Overview Addendum 11/15/2021 12:43 PM by Lizy Hernandez     Intrauterine pregnancy with the following problems identified:   [address] 4/23/2022 D=[place]  Parvo/varicella immune  Horizon neg  Hx of 36 week 3d  PTB - presented with labor  Declines covid vaccine  GBS pos urine  NIPTS- normal male  Flu vaccine 10/2021  AFP- neg                   FETAL SURVEY  A SINGLE VIABLE IUP AT 20W5D GA BY LMP IS SEEN. FETAL CARDIAC MOTION OBSERVED. FETAL ANATOMY WELL VISUALIZED AND APPEARS WITHIN NORMAL LIMITS. NO ABNORMALITIES ARE SEEN ON TODAY'S EXAM.  APPROPRIATE FETAL GROWTH IS SEEN. SIZE=DATES. GABRIEL, CERVIX AND PLACENTA APPEAR WITHIN NORMAL LIMITS.   GENDER: MALE

## 2022-01-06 ENCOUNTER — ROUTINE PRENATAL (OUTPATIENT)
Dept: OBGYN CLINIC | Age: 27
End: 2022-01-06
Payer: COMMERCIAL

## 2022-01-06 VITALS — BODY MASS INDEX: 22.96 KG/M2 | DIASTOLIC BLOOD PRESSURE: 58 MMHG | SYSTOLIC BLOOD PRESSURE: 109 MMHG | WEIGHT: 138 LBS

## 2022-01-06 DIAGNOSIS — Z34.80 SUPERVISION OF OTHER NORMAL PREGNANCY, ANTEPARTUM: Primary | ICD-10-CM

## 2022-01-06 PROCEDURE — 0502F SUBSEQUENT PRENATAL CARE: CPT | Performed by: OBSTETRICS & GYNECOLOGY

## 2022-01-06 NOTE — PROGRESS NOTES
Problem List  Date Reviewed: 12/9/2021          Codes Class Noted    Supervision of other normal pregnancy, antepartum ICD-10-CM: Z34.80  ICD-9-CM: V22.1  9/16/2021    Overview Addendum 11/15/2021 12:43 PM by Mary Ellen Matta     Intrauterine pregnancy with the following problems identified:   [facility] 4/23/2022 D=US  Parvo/varicella immune  Horizon neg  Hx of 36 week 3d  PTB - presented with labor  Declines covid vaccine  GBS pos urine  NIPTS- normal male  Flu vaccine 10/2021  AFP- neg

## 2022-02-03 ENCOUNTER — ROUTINE PRENATAL (OUTPATIENT)
Dept: OBGYN CLINIC | Age: 27
End: 2022-02-03
Payer: COMMERCIAL

## 2022-02-03 VITALS — SYSTOLIC BLOOD PRESSURE: 112 MMHG | BODY MASS INDEX: 25.96 KG/M2 | DIASTOLIC BLOOD PRESSURE: 57 MMHG | WEIGHT: 156 LBS

## 2022-02-03 DIAGNOSIS — Z34.80 SUPERVISION OF OTHER NORMAL PREGNANCY, ANTEPARTUM: Primary | ICD-10-CM

## 2022-02-03 PROCEDURE — 0502F SUBSEQUENT PRENATAL CARE: CPT | Performed by: OBSTETRICS & GYNECOLOGY

## 2022-02-03 PROCEDURE — 90471 IMMUNIZATION ADMIN: CPT | Performed by: OBSTETRICS & GYNECOLOGY

## 2022-02-03 PROCEDURE — 90715 TDAP VACCINE 7 YRS/> IM: CPT | Performed by: OBSTETRICS & GYNECOLOGY

## 2022-02-03 NOTE — PATIENT INSTRUCTIONS
Weeks 26 to 30 of Your Pregnancy: Care Instructions  Overview     You are now entering your last trimester of pregnancy. Your baby is growing quickly. Jazmyne Cruz probably feel your baby moving around more often. Your doctor may ask you to count your baby's kicks. Your back may ache as your body gets used to your baby's size and length. If you haven't already had the Tdap shot during this pregnancy, talk to your doctor about getting it. It will help protect your  against pertussis infection. During this time, it's important to take care of yourself and pay attention to what your body needs. If you feel sexual, you can explore ways to be close with your partner that match your comfort and desire. Follow-up care is a key part of your treatment and safety. Be sure to make and go to all appointments, and call your doctor if you are having problems. It's also a good idea to know your test results and keep a list of the medicines you take. How can you care for yourself at home? Take it easy at work  · Take frequent breaks. If possible, stop working when you are tired, and rest during your lunch hour. · Take bathroom breaks every 2 hours. · Change positions often. If you sit for long periods, stand up and walk around. · When you stand for a long time, keep one foot on a low stool with your knee bent. After standing a lot, sit with your feet up. · Avoid fumes, chemicals, and tobacco smoke. Be sexual in your own way  · Having sex during pregnancy is okay, unless your doctor tells you not to. · You may be very interested in sex, or you may have no interest at all. · Your growing belly can make it hard to find a good position during intercourse. [place] and explore. · You may get cramps in your uterus when your partner touches your breasts. · A back rub may relieve the backache or cramps that sometimes follow orgasm. Learn about  labor  · Watch for signs of  labor.  You may be going into labor if:  ? You have menstrual-like cramps, with or without nausea. ? You have about 6 or more contractions in 1 hour, even after you have had a glass of water and are resting. ? You have a low, dull backache that does not go away when you change your position. ? You have pain or pressure in your pelvis that comes and goes in a pattern. ? You have intestinal cramping or flu-like symptoms, with or without diarrhea.  ? You notice an increase or change in your vaginal discharge. Discharge may be heavy, mucus-like, watery, or streaked with blood. ? Your water breaks. · If you think you have  labor:  ? Drink 2 or 3 glasses of water or juice. Not drinking enough fluids can cause contractions. ? Stop what you are doing, and empty your bladder. Then lie down on your left side for at least 1 hour. ? While lying on your side, find your breast bone. Put your fingers in the soft spot just below it. Move your fingers down toward your belly button to find the top of your uterus. Check to see if it is tight. ? Contractions can be weak or strong. Record your contractions for an hour. Time a contraction from the start of one contraction to the start of the next one.  ? Single or several strong contractions without a pattern are called [place] contractions. They are practice contractions but not the start of labor. They often stop if you change what you are doing. ? Call your doctor if you have regular contractions. Where can you learn more? Go to http://www.gray.com/  Enter F816 in the search box to learn more about \"Weeks 26 to 30 of Your Pregnancy: Care Instructions. \"  Current as of: 2021               Content Version: 13.0  © 7808-0437 [employer]. Care instructions adapted under license by [employer] (which disclaims liability or warranty for this information).  If you have questions about a medical condition or this instruction, always ask your healthcare professional. [place] [address] any warranty or liability for your use of this information.

## 2022-02-03 NOTE — PROGRESS NOTES
After obtaining consent, and per orders of Dr. Irene Hayes, injection of Tdap Vaccine given by Serina Ellsworth MA. Patient instructed to remain in clinic for 20 minutes afterwards, and to report any adverse reaction to me immediately.     TDAP  : Tiange  Site: Left Deltoid  Route: Intramuscular  Dose: 0.5ML  Lot#: [id number]  Exp date: 11/23/23  NDC: 36134-085-89      Problem List  Date Reviewed: 1/6/2022          Codes Class Noted    Supervision of other normal pregnancy, antepartum ICD-10-CM: Z34.80  ICD-9-CM: V22.1  9/16/2021    Overview Addendum 11/15/2021 12:43 PM by Ray Meeks     Intrauterine pregnancy with the following problems identified:   [facility] 4/23/2022 D=US  Parvo/varicella immune  Horizon neg  Hx of 36 week 3d  PTB - presented with labor  Declines covid vaccine  GBS pos urine  NIPTS- normal male  Flu vaccine 10/2021  AFP- neg

## 2022-02-04 LAB
ERYTHROCYTE [DISTWIDTH] IN BLOOD BY AUTOMATED COUNT: 14 % (ref 11.5–14.5)
GLUCOSE 1H P 100 G GLC PO SERPL-MCNC: 92 MG/DL (ref 65–140)
HCT VFR BLD AUTO: 36.4 % (ref 35–47)
HGB BLD-MCNC: 11.4 G/DL (ref 11.5–16)
MCH RBC QN AUTO: 30 PG (ref 26–34)
MCHC RBC AUTO-ENTMCNC: 31.3 G/DL (ref 30–36.5)
MCV RBC AUTO: 95.8 FL (ref 80–99)
NRBC # BLD: 0 K/UL (ref 0–0.01)
NRBC BLD-RTO: 0 PER 100 WBC
PLATELET # BLD AUTO: 226 K/UL (ref 150–400)
PMV BLD AUTO: 9.1 FL (ref 8.9–12.9)
RBC # BLD AUTO: 3.8 M/UL (ref 3.8–5.2)
WBC # BLD AUTO: 8.7 K/UL (ref 3.6–11)

## 2022-02-18 ENCOUNTER — ROUTINE PRENATAL (OUTPATIENT)
Dept: OBGYN CLINIC | Age: 27
End: 2022-02-18
Payer: COMMERCIAL

## 2022-02-18 VITALS — BODY MASS INDEX: 25.56 KG/M2 | SYSTOLIC BLOOD PRESSURE: 104 MMHG | DIASTOLIC BLOOD PRESSURE: 58 MMHG | WEIGHT: 153.6 LBS

## 2022-02-18 DIAGNOSIS — Z34.80 SUPERVISION OF OTHER NORMAL PREGNANCY, ANTEPARTUM: Primary | ICD-10-CM

## 2022-02-18 PROCEDURE — 0502F SUBSEQUENT PRENATAL CARE: CPT | Performed by: OBSTETRICS & GYNECOLOGY

## 2022-02-18 NOTE — PROGRESS NOTES
Problem List  Date Reviewed: 2/3/2022          Codes Class Noted    Supervision of other normal pregnancy, antepartum ICD-10-CM: Z34.80  ICD-9-CM: V22.1  9/16/2021    Overview Addendum 2/4/2022 11:48 AM by Ruth Etienne     Intrauterine pregnancy with the following problems identified:   [facility] 4/23/2022 D=US  Parvo/varicella immune  Horizon neg  Hx of 36 week 3d  PTB - presented with labor  Declines covid vaccine  GBS pos urine  NIPTS- normal male  Flu vaccine 10/2021  AFP- neg  1hr GTT- normal

## 2022-03-04 ENCOUNTER — ROUTINE PRENATAL (OUTPATIENT)
Dept: OBGYN CLINIC | Age: 27
End: 2022-03-04
Payer: COMMERCIAL

## 2022-03-04 VITALS — WEIGHT: 154 LBS | SYSTOLIC BLOOD PRESSURE: 119 MMHG | BODY MASS INDEX: 25.63 KG/M2 | DIASTOLIC BLOOD PRESSURE: 70 MMHG

## 2022-03-04 DIAGNOSIS — Z34.80 SUPERVISION OF OTHER NORMAL PREGNANCY, ANTEPARTUM: Primary | ICD-10-CM

## 2022-03-04 PROCEDURE — 0502F SUBSEQUENT PRENATAL CARE: CPT | Performed by: OBSTETRICS & GYNECOLOGY

## 2022-03-04 NOTE — PROGRESS NOTES
Problem List  Date Reviewed: 2/18/2022          Codes Class Noted    Supervision of other normal pregnancy, antepartum ICD-10-CM: Z34.80  ICD-9-CM: V22.1  9/16/2021    Overview Addendum 2/4/2022 11:48 AM by Klever Henriquez     Intrauterine pregnancy with the following problems identified:   [facility] 4/23/2022 D=US  Parvo/varicella immune  Horizon neg  Hx of 36 week 3d  PTB - presented with labor  Declines covid vaccine  GBS pos urine  NIPTS- normal male  Flu vaccine 10/2021  AFP- neg  1hr GTT- normal

## 2022-03-18 PROBLEM — Z34.80 SUPERVISION OF OTHER NORMAL PREGNANCY, ANTEPARTUM: Status: ACTIVE | Noted: 2021-09-16

## 2022-03-24 ENCOUNTER — ROUTINE PRENATAL (OUTPATIENT)
Dept: OBGYN CLINIC | Age: 27
End: 2022-03-24

## 2022-03-24 VITALS — WEIGHT: 160.4 LBS | SYSTOLIC BLOOD PRESSURE: 111 MMHG | DIASTOLIC BLOOD PRESSURE: 65 MMHG | BODY MASS INDEX: 26.69 KG/M2

## 2022-03-24 DIAGNOSIS — Z34.80 SUPERVISION OF OTHER NORMAL PREGNANCY, ANTEPARTUM: Primary | ICD-10-CM

## 2022-03-24 PROCEDURE — 0502F SUBSEQUENT PRENATAL CARE: CPT | Performed by: OBSTETRICS & GYNECOLOGY

## 2022-03-24 NOTE — PROGRESS NOTES
LIMITED OB SCAN  A SINGLE VERTEX 35W5D IUP IS SEEN. FETAL CARDIAC MOTION OBSERVED. LIMITED ANATOMY WAS VISUALIZED AND APPEARS WNL. APPROPRIATE FETAL GROWTH IS SEEN. SIZE=DATES. GABRIEL AND PLACENTA APPEAR WITHIN NORMAL LIMITS.     Problem List  Date Reviewed: 3/4/2022          Codes Class Noted    Supervision of other normal pregnancy, antepartum ICD-10-CM: Z34.80  ICD-9-CM: V22.1  9/16/2021    Overview Addendum 2/4/2022 11:48 AM by Brenda Rodriguez     Intrauterine pregnancy with the following problems identified:   [facility] 4/23/2022 D=US  Parvo/varicella immune  Horizon neg  Hx of 36 week 3d  PTB - presented with labor  Declines covid vaccine  GBS pos urine  NIPTS- normal male  Flu vaccine 10/2021  AFP- neg  1hr GTT- normal

## 2022-03-27 LAB
B-HEM STREP SPEC QL CULT: POSITIVE
SPECIMEN STATUS REPORT, ROLRST: NORMAL

## 2022-04-01 ENCOUNTER — ROUTINE PRENATAL (OUTPATIENT)
Dept: OBGYN CLINIC | Age: 27
End: 2022-04-01
Payer: COMMERCIAL

## 2022-04-01 VITALS — DIASTOLIC BLOOD PRESSURE: 67 MMHG | BODY MASS INDEX: 27.12 KG/M2 | WEIGHT: 163 LBS | SYSTOLIC BLOOD PRESSURE: 116 MMHG

## 2022-04-01 DIAGNOSIS — Z34.80 SUPERVISION OF OTHER NORMAL PREGNANCY, ANTEPARTUM: Primary | ICD-10-CM

## 2022-04-01 PROCEDURE — 0502F SUBSEQUENT PRENATAL CARE: CPT | Performed by: OBSTETRICS & GYNECOLOGY

## 2022-04-01 RX ORDER — ESCITALOPRAM OXALATE 10 MG/1
TABLET ORAL
Qty: 90 TABLET | Refills: 4 | Status: SHIPPED | OUTPATIENT
Start: 2022-04-01 | End: 2022-05-27

## 2022-04-01 NOTE — PROGRESS NOTES
Problem List  Date Reviewed: 3/24/2022          Codes Class Noted    Supervision of other normal pregnancy, antepartum ICD-10-CM: Z34.80  ICD-9-CM: V22.1  9/16/2021    Overview Addendum 3/28/2022  2:58 PM by Philipp Damon     Intrauterine pregnancy with the following problems identified:   [facility] 4/23/2022 D=US  Parvo/varicella immune  Horizon neg  Hx of 36 week 3d  PTB - presented with labor  Declines covid vaccine  GBS pos urine  NIPTS- normal male  Flu vaccine 10/2021  AFP- neg  1hr GTT- normal  GBS- pos Pt became agitated and combative when pt worked w/ PT/OT. Pt not following commands and unable to re-orient pt even w/ the help of the sitter and charge nurse at bedside. MD notified. Pt placed in 4-point restraints. Will continue to monitor.

## 2022-04-01 NOTE — PATIENT INSTRUCTIONS
Week 40 of Your Pregnancy: Care Instructions  Overview     You are near the end of your pregnancy--and you're probably pretty uncomfortable. It may be harder to walk around. Lying down probably isn't comfortable either. You may have trouble getting to sleep or staying asleep. Most babies are born between 40 and 41 weeks. This is a good time to think about packing a bag for the hospital with items you'll need. Then you'll be ready when labor starts. Follow-up care is a key part of your treatment and safety. Be sure to make and go to all appointments, and call your doctor if you are having problems. It's also a good idea to know your test results and keep a list of the medicines you take. How can you care for yourself at home? Learn about breastfeeding  · Breastfeeding is best for your baby and good for you. · Breast milk has antibodies to help your baby fight infections. · If you breastfeed, you may lose weight faster. That's because making milk burns calories. · Learning the best ways to hold your baby will make breastfeeding easier. · Sometimes breastfeeding can make partners feel left out. If you have a partner, plan how you can care for your baby together. For example, your partner can bathe and diaper the baby. You can snuggle together when you breastfeed. · You may want to learn how to use a breast pump and store your milk. · If you choose to bottle feed, make the feeding feel like breastfeeding so you can bond with your baby. Always hold your baby and the bottle. Don't prop bottles or let your baby fall asleep with a bottle. Learn about crying  · It's common for babies to cry for 1 to 3 hours a day. Some cry more, and some cry less. · Babies don't cry to make you upset or because you're a bad parent. · Crying is how your baby communicates. Your baby may be hungry; have gas; need a diaper change; or feel cold, warm, tired, lonely, or tense. Sometimes babies cry for unknown reasons.   · If you respond to your baby's needs, your baby will learn to trust you. · Try to stay calm when your baby cries. Your baby may get more upset if they sense that you are upset. Know how to care for your   · Your baby's umbilical cord stump will drop off on its own, usually between 1 and 2 weeks. To care for your baby's umbilical cord area:  ? Clean the area at the bottom of the cord 2 or 3 times a day. ? Pay special attention to the area where the cord attaches to the skin. ? Keep the diaper folded below the cord. ? Use a damp washcloth or cotton ball to sponge bathe your baby until the stump has come off. · Your baby's first dark stool is called meconium. After the meconium is passed, your baby will develop their own bowel pattern. ? Some babies, especially  babies, have several bowel movements a day. Others have one or two a day, or one every 2 to 3 days. ?  babies often have loose, yellow stools. Formula-fed babies have more formed stools. ? If your baby's stools look like little pellets, your baby is constipated. After 2 days of constipation, call your baby's doctor. · If your baby will be circumcised, you can care for your baby at home. ? Gently rinse your baby's penis with warm water after every diaper change. Don't try to remove the film that forms on the penis. This film will go away on its own. Pat dry. ? Put petroleum ointment, such as Vaseline, on the area of the diaper that will touch your baby's penis. This will keep the diaper from sticking to your baby. ? Ask the doctor about giving your baby acetaminophen (Tylenol) for pain. Where can you learn more? Go to http://www.gray.com/  Enter N257 in the search box to learn more about \"Week 37 of Your Pregnancy: Care Instructions. \"  Current as of: 2021               Content Version: 13.2  © 1947-9568 [employer].    Care instructions adapted under license by [employer] (which disclaims liability or warranty for this information). If you have questions about a medical condition or this instruction, always ask your healthcare professional. [address] any warranty or liability for your use of this information.

## 2022-04-01 NOTE — TELEPHONE ENCOUNTER
33year old  36w6d pregnant last seen in the office on 3/24/2022    ?  Ok to refill has pended from the pharmacy    Please advise    Thank you

## 2022-04-04 NOTE — PATIENT INSTRUCTIONS
Week 40 of Your Pregnancy: Care Instructions  Overview     You are near the end of your pregnancy--and you're probably pretty uncomfortable. It may be harder to walk around. Lying down probably isn't comfortable either. You may have trouble getting to sleep or staying asleep. Most babies are born between 40 and 41 weeks. This is a good time to think about packing a bag for the hospital with items you'll need. Then you'll be ready when labor starts. Follow-up care is a key part of your treatment and safety. Be sure to make and go to all appointments, and call your doctor if you are having problems. It's also a good idea to know your test results and keep a list of the medicines you take. How can you care for yourself at home? Learn about breastfeeding  · Breastfeeding is best for your baby and good for you. · Breast milk has antibodies to help your baby fight infections. · If you breastfeed, you may lose weight faster. That's because making milk burns calories. · Learning the best ways to hold your baby will make breastfeeding easier. · Sometimes breastfeeding can make partners feel left out. If you have a partner, plan how you can care for your baby together. For example, your partner can bathe and diaper the baby. You can snuggle together when you breastfeed. · You may want to learn how to use a breast pump and store your milk. · If you choose to bottle feed, make the feeding feel like breastfeeding so you can bond with your baby. Always hold your baby and the bottle. Don't prop bottles or let your baby fall asleep with a bottle. Learn about crying  · It's common for babies to cry for 1 to 3 hours a day. Some cry more, and some cry less. · Babies don't cry to make you upset or because you're a bad parent. · Crying is how your baby communicates. Your baby may be hungry; have gas; need a diaper change; or feel cold, warm, tired, lonely, or tense. Sometimes babies cry for unknown reasons.   · If you respond to your baby's needs, your baby will learn to trust you. · Try to stay calm when your baby cries. Your baby may get more upset if they sense that you are upset. Know how to care for your   · Your baby's umbilical cord stump will drop off on its own, usually between 1 and 2 weeks. To care for your baby's umbilical cord area:  ? Clean the area at the bottom of the cord 2 or 3 times a day. ? Pay special attention to the area where the cord attaches to the skin. ? Keep the diaper folded below the cord. ? Use a damp washcloth or cotton ball to sponge bathe your baby until the stump has come off. · Your baby's first dark stool is called meconium. After the meconium is passed, your baby will develop their own bowel pattern. ? Some babies, especially  babies, have several bowel movements a day. Others have one or two a day, or one every 2 to 3 days. ?  babies often have loose, yellow stools. Formula-fed babies have more formed stools. ? If your baby's stools look like little pellets, your baby is constipated. After 2 days of constipation, call your baby's doctor. · If your baby will be circumcised, you can care for your baby at home. ? Gently rinse your baby's penis with warm water after every diaper change. Don't try to remove the film that forms on the penis. This film will go away on its own. Pat dry. ? Put petroleum ointment, such as Vaseline, on the area of the diaper that will touch your baby's penis. This will keep the diaper from sticking to your baby. ? Ask the doctor about giving your baby acetaminophen (Tylenol) for pain. Where can you learn more? Go to http://www.gray.com/  Enter N257 in the search box to learn more about \"Week 37 of Your Pregnancy: Care Instructions. \"  Current as of: 2021               Content Version: 13.2  © 5718-5223 [employer].    Care instructions adapted under license by [employer] (which disclaims liability or warranty for this information). If you have questions about a medical condition or this instruction, always ask your healthcare professional. [address] any warranty or liability for your use of this information.

## 2022-04-07 ENCOUNTER — ROUTINE PRENATAL (OUTPATIENT)
Dept: OBGYN CLINIC | Age: 27
End: 2022-04-07
Payer: COMMERCIAL

## 2022-04-07 VITALS — BODY MASS INDEX: 27.37 KG/M2 | SYSTOLIC BLOOD PRESSURE: 115 MMHG | DIASTOLIC BLOOD PRESSURE: 64 MMHG | WEIGHT: 164.5 LBS

## 2022-04-07 DIAGNOSIS — Z34.80 SUPERVISION OF OTHER NORMAL PREGNANCY, ANTEPARTUM: Primary | ICD-10-CM

## 2022-04-07 PROCEDURE — 0502F SUBSEQUENT PRENATAL CARE: CPT | Performed by: OBSTETRICS & GYNECOLOGY

## 2022-04-07 NOTE — PROGRESS NOTES
Problem List  Date Reviewed: 4/1/2022          Codes Class Noted    Supervision of other normal pregnancy, antepartum ICD-10-CM: Z34.80  ICD-9-CM: V22.1  9/16/2021    Overview Addendum 3/28/2022  2:58 PM by Remington Elias     Intrauterine pregnancy with the following problems identified:   [facility] 4/23/2022 D=US  Parvo/varicella immune  Horizon neg  Hx of 36 week 3d  PTB - presented with labor  Declines covid vaccine  GBS pos urine  NIPTS- normal male  Flu vaccine 10/2021  AFP- neg  1hr GTT- normal  GBS- pos

## 2022-04-12 NOTE — PATIENT INSTRUCTIONS
Week 38 of Your Pregnancy: Care Instructions  Overview     Believe it or not, your baby is almost here. You may have ideas about your baby's personality because of how much your baby moves. Or you may have noticed how your baby responds to sounds, warmth, cold, and light. You may even know what kind of music your baby likes. By now, you have a better idea of what to expect during delivery. You may have talked about your birth preferences with your doctor. But even if you want a vaginal birth, it's a good idea to learn about  births.  birth means that your baby is born through a cut (incision) in your lower belly. In some cases it may be the best choice for the health of you and your baby. Follow-up care is a key part of your treatment and safety. Be sure to make and go to all appointments, and call your doctor if you are having problems. It's also a good idea to know your test results and keep a list of the medicines you take. How can you care for yourself at home? Learn about  birth  · Most C-sections are unplanned. They are done because of problems that occur during labor. These problems might include:  ? Labor that slows or stops. ? High blood pressure or other problems for you.  ? Signs of distress in your baby. These signs may include a very fast or slow heart rate. · Although you and your baby are likely do well after a , it is major surgery. It has more risks than a vaginal delivery. · In some cases, a planned  may be safer than a vaginal delivery. This may be the case if:  ? You have a health problem, such as a heart condition. ? Your baby isn't in a head-down position for delivery. This is called a breech position. ? The uterus has scars from past surgeries. This could increase the chance of a tear in the uterus. ? There is a problem with the placenta.  ? You have an infection, such as genital herpes, that could be spread to your baby.   ? You are having twins or more. ? Your baby weighs 9 to 10 pounds or more. · Because of the risks of a , planned C-sections generally should be done only for medical reasons. And a planned  should be done at 39 weeks or later unless there is a medical reason to do it sooner. Know what to expect after delivery, and plan for the first few weeks at home  · You, your baby, and your partner or  will get identification bands. Only people with matching bands can  the baby from the nursery. · You will learn how to feed, diaper, and bathe your baby. And you will learn how to care for the umbilical cord stump. If your baby will be circumcised, you will also learn how to care for that. · Ask people to wait to visit you until you are at home. And ask them to wash their hands before they touch your baby. · Make sure you have another adult in your home for at least 2 or 3 days after the birth. · During the first 2 weeks, limit when friends and family can visit. · Do not allow visitors who have colds or infections. Make sure all visitors are up to date with their vaccinations. Never let anyone smoke around your baby. · Try to nap when the baby naps. Be aware of postpartum depression  · \"Baby blues\" are common for the first 1 to 2 weeks after birth. You may cry or feel sad or irritable for no reason. · Sometimes these feelings last longer and are more intense. This is called postpartum depression. · If your symptoms last for more than a few weeks or you feel very depressed, ask your doctor for help. · Postpartum depression can be treated. Support groups and counseling can help. Sometimes medicine can also help. Where can you learn more? Go to http://www.gray.com/  Enter B044 in the search box to learn more about \"Week 38 of Your Pregnancy: Care Instructions. \"  Current as of: 2021               Content Version: 13.2  © 5484-9127 [employer], [facility].    Care instructions adapted under license by [employer] (which disclaims liability or warranty for this information). If you have questions about a medical condition or this instruction, always ask your healthcare professional. [address] any warranty or liability for your use of this information.

## 2022-04-15 ENCOUNTER — PATIENT MESSAGE (OUTPATIENT)
Dept: OBGYN CLINIC | Age: 27
End: 2022-04-15

## 2022-04-15 ENCOUNTER — ROUTINE PRENATAL (OUTPATIENT)
Dept: OBGYN CLINIC | Age: 27
End: 2022-04-15
Payer: COMMERCIAL

## 2022-04-15 VITALS — WEIGHT: 166 LBS | BODY MASS INDEX: 27.62 KG/M2 | DIASTOLIC BLOOD PRESSURE: 72 MMHG | SYSTOLIC BLOOD PRESSURE: 132 MMHG

## 2022-04-15 DIAGNOSIS — Z34.80 SUPERVISION OF OTHER NORMAL PREGNANCY, ANTEPARTUM: Primary | ICD-10-CM

## 2022-04-15 PROCEDURE — 0502F SUBSEQUENT PRENATAL CARE: CPT | Performed by: OBSTETRICS & GYNECOLOGY

## 2022-04-15 NOTE — PROGRESS NOTES
Problem List  Date Reviewed: 4/7/2022          Codes Class Noted    Supervision of other normal pregnancy, antepartum ICD-10-CM: Z34.80  ICD-9-CM: V22.1  9/16/2021    Overview Addendum 3/28/2022  2:58 PM by [facility]     Intrauterine pregnancy with the following problems identified:   [facility] 4/23/2022 D=US  Parvo/varicella immune  Horizon neg  Hx of 36 week 3d  PTB - presented with labor  Declines covid vaccine  GBS pos urine  NIPTS- normal male  Flu vaccine 10/2021  AFP- neg  1hr GTT- normal  GBS- pos

## 2022-04-18 ENCOUNTER — ANESTHESIA EVENT (OUTPATIENT)
Dept: LABOR AND DELIVERY | Age: 27
End: 2022-04-18
Payer: COMMERCIAL

## 2022-04-18 ENCOUNTER — ANESTHESIA (OUTPATIENT)
Dept: LABOR AND DELIVERY | Age: 27
End: 2022-04-18
Payer: COMMERCIAL

## 2022-04-18 ENCOUNTER — HOSPITAL ENCOUNTER (INPATIENT)
Age: 27
LOS: 1 days | Discharge: HOME | End: 2022-04-19
Attending: OBSTETRICS & GYNECOLOGY | Admitting: OBSTETRICS & GYNECOLOGY
Payer: COMMERCIAL

## 2022-04-18 PROBLEM — Z34.90 ENCOUNTER FOR ELECTIVE INDUCTION OF LABOR: Status: ACTIVE | Noted: 2022-04-18

## 2022-04-18 LAB
BASOPHILS # BLD: 0 K/UL (ref 0–0.1)
BASOPHILS NFR BLD: 0 % (ref 0–1)
DIFFERENTIAL METHOD BLD: ABNORMAL
EOSINOPHIL # BLD: 0.1 K/UL (ref 0–0.4)
EOSINOPHIL NFR BLD: 1 % (ref 0–7)
ERYTHROCYTE [DISTWIDTH] IN BLOOD BY AUTOMATED COUNT: 13.6 % (ref 11.5–14.5)
HCT VFR BLD AUTO: 36.5 % (ref 35–47)
HGB BLD-MCNC: 12.3 G/DL (ref 11.5–16)
IMM GRANULOCYTES # BLD AUTO: 0 K/UL (ref 0–0.04)
IMM GRANULOCYTES NFR BLD AUTO: 1 % (ref 0–0.5)
LYMPHOCYTES # BLD: 2.1 K/UL (ref 0.8–3.5)
LYMPHOCYTES NFR BLD: 26 % (ref 12–49)
MCH RBC QN AUTO: 29.4 PG (ref 26–34)
MCHC RBC AUTO-ENTMCNC: 33.7 G/DL (ref 30–36.5)
MCV RBC AUTO: 87.3 FL (ref 80–99)
MONOCYTES # BLD: 0.8 K/UL (ref 0–1)
MONOCYTES NFR BLD: 11 % (ref 5–13)
NEUTS SEG # BLD: 4.8 K/UL (ref 1.8–8)
NEUTS SEG NFR BLD: 61 % (ref 32–75)
NRBC # BLD: 0 K/UL (ref 0–0.01)
NRBC BLD-RTO: 0 PER 100 WBC
PLATELET # BLD AUTO: 203 K/UL (ref 150–400)
PMV BLD AUTO: 9 FL (ref 8.9–12.9)
RBC # BLD AUTO: 4.18 M/UL (ref 3.8–5.2)
WBC # BLD AUTO: 7.9 K/UL (ref 3.6–11)

## 2022-04-18 PROCEDURE — 75410000003 HC RECOV DEL/VAG/CSECN EA 0.5 HR: Performed by: OBSTETRICS & GYNECOLOGY

## 2022-04-18 PROCEDURE — 76060000078 HC EPIDURAL ANESTHESIA: Performed by: ANESTHESIOLOGY

## 2022-04-18 PROCEDURE — 77030014125 HC TY EPDRL BBMI -B: Performed by: ANESTHESIOLOGY

## 2022-04-18 PROCEDURE — 77030005513 HC CATH URETH FOL11 MDII -B

## 2022-04-18 PROCEDURE — 85025 COMPLETE CBC W/AUTO DIFF WBC: CPT

## 2022-04-18 PROCEDURE — 74011250636 HC RX REV CODE- 250/636: Performed by: OBSTETRICS & GYNECOLOGY

## 2022-04-18 PROCEDURE — 75410000000 HC DELIVERY VAGINAL/SINGLE: Performed by: OBSTETRICS & GYNECOLOGY

## 2022-04-18 PROCEDURE — 4A1HXCZ MONITORING OF PRODUCTS OF CONCEPTION, CARDIAC RATE, EXTERNAL APPROACH: ICD-10-PCS | Performed by: OBSTETRICS & GYNECOLOGY

## 2022-04-18 PROCEDURE — 74011000258 HC RX REV CODE- 258: Performed by: OBSTETRICS & GYNECOLOGY

## 2022-04-18 PROCEDURE — 75410000002 HC LABOR FEE PER 1 HR: Performed by: OBSTETRICS & GYNECOLOGY

## 2022-04-18 PROCEDURE — 59400 OBSTETRICAL CARE: CPT | Performed by: OBSTETRICS & GYNECOLOGY

## 2022-04-18 PROCEDURE — 10907ZC DRAINAGE OF AMNIOTIC FLUID, THERAPEUTIC FROM PRODUCTS OF CONCEPTION, VIA NATURAL OR ARTIFICIAL OPENING: ICD-10-PCS | Performed by: OBSTETRICS & GYNECOLOGY

## 2022-04-18 PROCEDURE — 3E033VJ INTRODUCTION OF OTHER HORMONE INTO PERIPHERAL VEIN, PERCUTANEOUS APPROACH: ICD-10-PCS | Performed by: OBSTETRICS & GYNECOLOGY

## 2022-04-18 PROCEDURE — 77030010848 HC CATH INTUTR PRSS KOLB -B

## 2022-04-18 PROCEDURE — 74011250637 HC RX REV CODE- 250/637: Performed by: OBSTETRICS & GYNECOLOGY

## 2022-04-18 PROCEDURE — 65270000029 HC RM PRIVATE

## 2022-04-18 PROCEDURE — 74011000250 HC RX REV CODE- 250: Performed by: ANESTHESIOLOGY

## 2022-04-18 PROCEDURE — 36415 COLL VENOUS BLD VENIPUNCTURE: CPT

## 2022-04-18 RX ORDER — NALOXONE HYDROCHLORIDE 0.4 MG/ML
0.4 INJECTION, SOLUTION INTRAMUSCULAR; INTRAVENOUS; SUBCUTANEOUS AS NEEDED
Status: DISCONTINUED | OUTPATIENT
Start: 2022-04-18 | End: 2022-04-18 | Stop reason: HOSPADM

## 2022-04-18 RX ORDER — HYDROCORTISONE ACETATE PRAMOXINE HCL 2.5; 1 G/100G; G/100G
CREAM TOPICAL AS NEEDED
Status: DISCONTINUED | OUTPATIENT
Start: 2022-04-18 | End: 2022-04-18

## 2022-04-18 RX ORDER — SIMETHICONE 80 MG
80 TABLET,CHEWABLE ORAL
Status: DISCONTINUED | OUTPATIENT
Start: 2022-04-18 | End: 2022-04-19 | Stop reason: HOSPADM

## 2022-04-18 RX ORDER — OXYTOCIN/RINGER'S LACTATE 30/500 ML
87.3 PLASTIC BAG, INJECTION (ML) INTRAVENOUS AS NEEDED
Status: DISCONTINUED | OUTPATIENT
Start: 2022-04-18 | End: 2022-04-19 | Stop reason: HOSPADM

## 2022-04-18 RX ORDER — DOCUSATE SODIUM 100 MG/1
100 CAPSULE, LIQUID FILLED ORAL 2 TIMES DAILY
Status: DISCONTINUED | OUTPATIENT
Start: 2022-04-18 | End: 2022-04-19 | Stop reason: HOSPADM

## 2022-04-18 RX ORDER — HYDROCODONE BITARTRATE AND ACETAMINOPHEN 5; 325 MG/1; MG/1
1 TABLET ORAL
Status: DISCONTINUED | OUTPATIENT
Start: 2022-04-18 | End: 2022-04-19 | Stop reason: HOSPADM

## 2022-04-18 RX ORDER — DIPHENHYDRAMINE HCL 25 MG
25 CAPSULE ORAL
Status: DISCONTINUED | OUTPATIENT
Start: 2022-04-18 | End: 2022-04-19 | Stop reason: HOSPADM

## 2022-04-18 RX ORDER — SODIUM CHLORIDE 0.9 % (FLUSH) 0.9 %
5-40 SYRINGE (ML) INJECTION AS NEEDED
Status: DISCONTINUED | OUTPATIENT
Start: 2022-04-18 | End: 2022-04-19 | Stop reason: HOSPADM

## 2022-04-18 RX ORDER — BUPIVACAINE HYDROCHLORIDE 2.5 MG/ML
INJECTION, SOLUTION EPIDURAL; INFILTRATION; INTRACAUDAL AS NEEDED
Status: DISCONTINUED | OUTPATIENT
Start: 2022-04-18 | End: 2022-04-18 | Stop reason: HOSPADM

## 2022-04-18 RX ORDER — LIDOCAINE HYDROCHLORIDE 10 MG/ML
20 INJECTION INFILTRATION; PERINEURAL ONCE
Status: DISCONTINUED | OUTPATIENT
Start: 2022-04-18 | End: 2022-04-18 | Stop reason: HOSPADM

## 2022-04-18 RX ORDER — SODIUM CHLORIDE, SODIUM LACTATE, POTASSIUM CHLORIDE, CALCIUM CHLORIDE 600; 310; 30; 20 MG/100ML; MG/100ML; MG/100ML; MG/100ML
125 INJECTION, SOLUTION INTRAVENOUS CONTINUOUS
Status: DISCONTINUED | OUTPATIENT
Start: 2022-04-18 | End: 2022-04-19 | Stop reason: HOSPADM

## 2022-04-18 RX ORDER — OXYTOCIN/RINGER'S LACTATE 30/500 ML
10 PLASTIC BAG, INJECTION (ML) INTRAVENOUS AS NEEDED
Status: DISCONTINUED | OUTPATIENT
Start: 2022-04-18 | End: 2022-04-19 | Stop reason: HOSPADM

## 2022-04-18 RX ORDER — NALOXONE HYDROCHLORIDE 0.4 MG/ML
0.4 INJECTION, SOLUTION INTRAMUSCULAR; INTRAVENOUS; SUBCUTANEOUS AS NEEDED
Status: DISCONTINUED | OUTPATIENT
Start: 2022-04-18 | End: 2022-04-19 | Stop reason: HOSPADM

## 2022-04-18 RX ORDER — OXYTOCIN/RINGER'S LACTATE 30/500 ML
0-20 PLASTIC BAG, INJECTION (ML) INTRAVENOUS
Status: DISCONTINUED | OUTPATIENT
Start: 2022-04-18 | End: 2022-04-18

## 2022-04-18 RX ORDER — MAG HYDROX/ALUMINUM HYD/SIMETH 200-200-20
30 SUSPENSION, ORAL (FINAL DOSE FORM) ORAL
Status: DISCONTINUED | OUTPATIENT
Start: 2022-04-18 | End: 2022-04-18 | Stop reason: HOSPADM

## 2022-04-18 RX ORDER — EPHEDRINE SULFATE/0.9% NACL/PF 50 MG/5 ML
10 SYRINGE (ML) INTRAVENOUS
Status: COMPLETED | OUTPATIENT
Start: 2022-04-18 | End: 2022-04-18

## 2022-04-18 RX ORDER — SODIUM CHLORIDE 0.9 % (FLUSH) 0.9 %
5-40 SYRINGE (ML) INJECTION EVERY 8 HOURS
Status: DISCONTINUED | OUTPATIENT
Start: 2022-04-18 | End: 2022-04-19 | Stop reason: HOSPADM

## 2022-04-18 RX ORDER — ZOLPIDEM TARTRATE 5 MG/1
5 TABLET ORAL
Status: DISCONTINUED | OUTPATIENT
Start: 2022-04-18 | End: 2022-04-19 | Stop reason: HOSPADM

## 2022-04-18 RX ORDER — ONDANSETRON 4 MG/1
4 TABLET, ORALLY DISINTEGRATING ORAL
Status: ACTIVE | OUTPATIENT
Start: 2022-04-18 | End: 2022-04-19

## 2022-04-18 RX ORDER — IBUPROFEN 800 MG/1
800 TABLET ORAL EVERY 8 HOURS
Status: DISCONTINUED | OUTPATIENT
Start: 2022-04-18 | End: 2022-04-19 | Stop reason: HOSPADM

## 2022-04-18 RX ORDER — FENTANYL/BUPIVACAINE/NS/PF 2-1250MCG
10 PREFILLED PUMP RESERVOIR EPIDURAL CONTINUOUS
Status: DISCONTINUED | OUTPATIENT
Start: 2022-04-18 | End: 2022-04-18 | Stop reason: HOSPADM

## 2022-04-18 RX ORDER — BUPIVACAINE HYDROCHLORIDE 5 MG/ML
INJECTION, SOLUTION EPIDURAL; INTRACAUDAL AS NEEDED
Status: DISCONTINUED | OUTPATIENT
Start: 2022-04-18 | End: 2022-04-18 | Stop reason: HOSPADM

## 2022-04-18 RX ORDER — ONDANSETRON 2 MG/ML
4 INJECTION INTRAMUSCULAR; INTRAVENOUS
Status: DISCONTINUED | OUTPATIENT
Start: 2022-04-18 | End: 2022-04-18 | Stop reason: HOSPADM

## 2022-04-18 RX ORDER — OXYTOCIN/RINGER'S LACTATE 30/500 ML
0-20 PLASTIC BAG, INJECTION (ML) INTRAVENOUS
Status: DISCONTINUED | OUTPATIENT
Start: 2022-04-18 | End: 2022-04-19 | Stop reason: HOSPADM

## 2022-04-18 RX ADMIN — Medication 10 MG: at 10:58

## 2022-04-18 RX ADMIN — BUPIVACAINE HYDROCHLORIDE 10 ML: 2.5 INJECTION, SOLUTION EPIDURAL; INFILTRATION; INTRACAUDAL; PERINEURAL at 09:41

## 2022-04-18 RX ADMIN — ONDANSETRON 4 MG: 2 INJECTION INTRAMUSCULAR; INTRAVENOUS at 10:26

## 2022-04-18 RX ADMIN — SODIUM CHLORIDE, POTASSIUM CHLORIDE, SODIUM LACTATE AND CALCIUM CHLORIDE 999 ML/HR: 600; 310; 30; 20 INJECTION, SOLUTION INTRAVENOUS at 11:18

## 2022-04-18 RX ADMIN — IBUPROFEN 800 MG: 800 TABLET, FILM COATED ORAL at 17:42

## 2022-04-18 RX ADMIN — SODIUM CHLORIDE 2.5 MILLION UNITS: 9 INJECTION, SOLUTION INTRAVENOUS at 10:14

## 2022-04-18 RX ADMIN — SODIUM CHLORIDE, POTASSIUM CHLORIDE, SODIUM LACTATE AND CALCIUM CHLORIDE 999 ML/HR: 600; 310; 30; 20 INJECTION, SOLUTION INTRAVENOUS at 08:31

## 2022-04-18 RX ADMIN — BUPIVACAINE HYDROCHLORIDE 5 ML: 5 INJECTION, SOLUTION EPIDURAL; INTRACAUDAL; PERINEURAL at 11:39

## 2022-04-18 RX ADMIN — SODIUM CHLORIDE, POTASSIUM CHLORIDE, SODIUM LACTATE AND CALCIUM CHLORIDE 999 ML/HR: 600; 310; 30; 20 INJECTION, SOLUTION INTRAVENOUS at 06:11

## 2022-04-18 RX ADMIN — SODIUM CHLORIDE, POTASSIUM CHLORIDE, SODIUM LACTATE AND CALCIUM CHLORIDE 999 ML/HR: 600; 310; 30; 20 INJECTION, SOLUTION INTRAVENOUS at 09:41

## 2022-04-18 RX ADMIN — DOCUSATE SODIUM 100 MG: 100 CAPSULE, LIQUID FILLED ORAL at 17:42

## 2022-04-18 RX ADMIN — SODIUM CHLORIDE, POTASSIUM CHLORIDE, SODIUM LACTATE AND CALCIUM CHLORIDE 125 ML/HR: 600; 310; 30; 20 INJECTION, SOLUTION INTRAVENOUS at 12:35

## 2022-04-18 RX ADMIN — SODIUM CHLORIDE 5 MILLION UNITS: 900 INJECTION INTRAVENOUS at 06:16

## 2022-04-18 RX ADMIN — Medication 10 ML/HR: at 10:17

## 2022-04-18 RX ADMIN — OXYTOCIN 2 MILLI-UNITS/MIN: 10 INJECTION, SOLUTION INTRAMUSCULAR; INTRAVENOUS at 06:53

## 2022-04-18 NOTE — ROUTINE PROCESS
SBAR IN Report: Mother    Verbal report received from Yas Campos RN (full name & credentials) on this patient, who is now being transferred from L&D (unit) for routine progression of care. The patient is not wearing a green \"Anesthesia-Duramorph\" band. Report consisted of patient's Situation, Background, Assessment and Recommendations (SBAR).  ID bands were compared with the identification form, and verified with the patient and transferring nurse. Information from the SBAR, Procedure Summary, Intake/Output, MAR, Accordion, Recent Results and Med Rec Status and the [place]ana Report was reviewed with the transferring nurse; opportunity for questions and clarification provided.

## 2022-04-18 NOTE — PROGRESS NOTES
8860 - Pt arrived for scheduled induction at this time. Pt oriented to room, given call bell, and instructed to change into gown. 200 - RN reviewing/obtaining consents at this time. Pt expresses no immediate questions/concerns. 0600 - RN connecting pt to EFM at this time. 9822 - Pt exhibiting vagal response to PIV placement. RN repositioning pt to lateral right side and starting IVF bolus. 9700 - RN administering pt's first dose of PCN G for pt's GBS positive status at this time. 0785 - RN starting pitocin at this time. 5558 - Bedside and Verbal shift change report given to JILLIAN Jefferson (oncoming nurse) by JILLIAN Villa (offgoing nurse). Report included the following information SBAR, Kardex, Procedure Summary, Intake/Output, MAR and Recent Results.

## 2022-04-18 NOTE — PROGRESS NOTES
0720: Bedside and Verbal shift change report given to JILLIAN Cruz (oncoming nurse) by JILLIAN Larose (offgoing nurse). Report included the following information SBAR, Kardex, Procedure Summary, Intake/Output, MAR, Recent Results and Med Rec Status. 0730: Reviewed poc with pt and , both verbalized understanding of all information, all questions answered. 1065: SVE by dr Dante Mendoza. 2/50/-2. AROM clear scant fluid. 0930: Dr. Yaz Cummings at bedside for epidural placement, this rn remaining at bedside throughout entire procedure. Pt tolerated well.     1000: Dr. Dante Mendoza at bedside. SVE 3cm, 70%. IUPC placed. 1058: ephedrine given for hypotension and nausea treated with zofran. FHR stable throughout. Pt complaining of pain in lower back, right hip, and bilaterally on lower abdomen/pelvis. Turned to right side with minimal relief. Called Dr. Yaz Cummings and notified of pts low pressures from baseline and continued discomfort. [address]. Yaz Cummings gave bolus. BP stable. 1200 Dr Dante Mendoza at bedside, SVE 6 cm. Pt turned on left side with close knee positioning with peanut ball. 1350: BP 81/43. Call to Dr. Yaz Cummings, IVF bolus running. Turning epidural down per order. 1400: dr. Dante Mendoza at bedside SVE 10/100    1411  viable male infant by Dr. Dante Mendoza. Apgars 7/8. R mediolateral episiotomy for FHR. Delivery QBL: 370 ml  2 hour QBL: 85 ml  Total QBL: 455 ml    1600: fundus deviated to right. Pt unable to ambulate at this time due to right leg residual weakness, transferred pt to toilet with 2 RNs and wheelchair assist, voided 200ml. Fundus returned to midline post void. 1630: preparing pt for transfer to MIU.       1715: TRANSFER - OUT REPORT:     Verbal report given to [facility] RN (name) on Aurora Abts  being transferred to MIU (unit) for routine progression of care       Report consisted of patients Situation, Background, Assessment and   Recommendations(SBAR).      Information from the following report(s) SBAR, Kardex, Procedure Summary, Intake/Output, MAR, Recent Results and Med Rec Status was reviewed with the receiving nurse. Lines:   Peripheral IV 04/18/22 Anterior;Right Forearm (Active)   Site Assessment Clean, dry, & intact 04/18/22 0726   Phlebitis Assessment 0 04/18/22 0726   Infiltration Assessment 0 04/18/22 0726   Dressing Status Clean, dry, & intact 04/18/22 0726   Dressing Type Tape;Transparent 04/18/22 0726   Hub Color/Line Status Pink; Infusing 04/18/22 0726   Action Taken Blood drawn 04/18/22 0600   Alcohol Cap Used Yes 04/18/22 0600        Opportunity for questions and clarification was provided.       Patient transported with:   Registered Nurse

## 2022-04-18 NOTE — ANESTHESIA PROCEDURE NOTES
Epidural Block    Patient location during procedure: OB  Start time: 4/18/2022 9:31 AM  End time: 4/18/2022 9:41 AM  Reason for block: labor epidural  Staffing  Performed: attending   Anesthesiologist: Doug Turpin MD  Preanesthetic Checklist  Completed: patient identified, IV checked, site marked, risks and benefits discussed, surgical consent, monitors and equipment checked, pre-op evaluation and timeout performed  Block Placement  Patient position: sitting  Prep: ChloraPrep  Sterility prep: cap, drape, gloves, gown, hand and mask  Sedation level: no sedation  Patient monitoring: continuous pulse oximetry, frequent blood pressure checks and heart rate  Approach: midline  Location: lumbar  Epidural  Loss of resistance technique: air  Guidance: landmark technique  Needle  Needle type: Tuohy   Needle gauge: 17 G  Needle length: 9 cm  Catheter type: multi-orifice  Catheter size: 20 G  Catheter securement method: clear occlusive dressing and surgical tape  Test dose: negative  Assessment  Sensory level: T10  Block outcome: pain improved  Number of attempts: 1  Procedure assessment: patient tolerated procedure well with no immediate complications

## 2022-04-18 NOTE — H&P
History & Physical    Name: Cecilia Waddell MRN: [id number]  SSN: [id number]    YOB: 1995  Age: 33 y.o. Sex: female        Subjective:     Estimated Date of Delivery: 22  OB History        4    Para   1    Term   0       1    AB   2    Living   1       SAB   2    IAB   0    Ectopic   0    Molar   0    Multiple   0    Live Births   1                Ms. Beatrice Bell is admitted with pregnancy at 39w2d for induction of labor. Prenatal course was normal. Please see prenatal records for details. Past Medical History:   Diagnosis Date    Anemia     Herpes simplex virus (HSV) infection     IUD (intrauterine device) in place 01/15/2021    Mirena placed    Migraines     Postpartum anxiety     Psychiatric problem     anxiety (lexapro)     History reviewed. No pertinent surgical history. Social History     Occupational History    Not on file   Tobacco Use    Smoking status: Never Smoker    Smokeless tobacco: Never Used   Substance and Sexual Activity    Alcohol use: Not Currently    Drug use: Never    Sexual activity: Yes     Partners: Male     History reviewed. No pertinent family history. Allergies   Allergen Reactions    Zyrtec [Cetirizine] Hives     Prior to Admission medications    Medication Sig Start Date End Date Taking? Authorizing Provider   escitalopram oxalate (LEXAPRO) 10 mg tablet TAKE 1 TABLET BY MOUTH EVERY DAY 22  Yes Noreen Louis MD   prenatal vit-iron fumarate-fa 27 mg iron- 0.8 mg tab tablet Take 1 Tablet by mouth daily. 21  Yes Noreen Louis MD   PNV No12-Iron-FA-DSS-OM-3 29 mg iron-1 mg -50 mg CPKD Take  by mouth. Yes Provider, Historical   AMBULATORY BREAST PUMP Dispense 1 electric pump  Patient not taking: Reported on 2022 3/4/22   Noreen Louis MD   valACYclovir (VALTREX) 1 gram tablet Take 2 Tablets by mouth two (2) times a day.   Patient not taking: Reported on 2/3/2022 11/29/21   Noreen Louis MD   ondansetron [facility] ODT) 8 mg disintegrating tablet Take 1 Tablet by mouth every eight (8) hours as needed for Nausea or Vomiting. Patient not taking: Reported on 2/3/2022 9/13/21   Quinton Wadsworth MD   doxylamine-pyridoxine, vit B6, (Bonjesta) 20-20 mg TbID Take 1 Tablet by mouth two (2) times a day. Patient not taking: Reported on 2/3/2022 9/13/21   Quinton Wadsworth MD   ibuprofen (MOTRIN) 600 mg tablet Take 1 Tab by mouth every six (6) hours as needed for Pain. Take with food. Patient not taking: Reported on 6/1/2021 11/29/20   Brandon Campo MD   ferrous sulfate (Slow Fe) 142 mg (45 mg iron) ER tablet Take 142 mg by mouth Daily (before breakfast). Patient not taking: Reported on 6/1/2021    Provider, Historical   acetaminophen (TylenoL) 325 mg tablet Take 1,000 mg by mouth every four (4) hours as needed for Pain. Patient not taking: Reported on 6/1/2021    Provider, Historical   butalbital-acetaminophen-caffeine (FIORICET, ESGIC) -40 mg per tablet Take 1 Tab by mouth every four (4) hours as needed for Headache. Patient not taking: Reported on 6/1/2021 11/19/20   Quinton Wadsworth MD   AMBULATORY BREAST PUMP Dispense 1 electric breast pump. Use as directed. Patient not taking: Reported on 6/1/2021 10/16/20   Quinton Wadsworth MD        Review of Systems: A comprehensive review of systems was negative except for that written in the HPI. Objective:     Vitals:  Vitals:    04/18/22 1143 04/18/22 1145 04/18/22 1146 04/18/22 1149   BP: (!) 98/55  (!) 100/57 (!) 101/58   Pulse: 95  91 96   Resp:    16   Temp:       SpO2:  94% 99%    Weight:       Height:            Physical Exam:  Patient without distress.   Heart: Regular rate and rhythm  Lung: clear to auscultation throughout lung fields, no wheezes, no rales, no rhonchi and normal respiratory effort  Abdomen: soft, nontender  Fundus: soft and non tender  Perineum: blood absent, amniotic fluid absent  Cervical Exam: 2 cm dilated    50% effaced    -2 station    Presenting Part: cephalic  Membranes:  Artificial Rupture of Membranes; Amniotic Fluid: small amount of clear fluid  Fetal Heart Rate: Reactive    Prenatal Labs:   Lab Results   Component Value Date/Time    Rubella, External 1.10-Immune 05/26/2020 12:00 AM    HBsAg, External Negative 05/26/2020 12:00 AM    HIV, External Negative 05/26/2020 12:00 AM    Gonorrhea, External Negative 05/26/2020 12:00 AM    Chlamydia, External Negative 05/26/2020 12:00 AM        Assessment/Plan:     Plan: Admit for Reassuring fetal status, Continue plan for vaginal delivery. Group B Strep was positive, will treat prophylactically with penicillin.     Signed By:  Reynold Benson MD     April 18, 2022

## 2022-04-18 NOTE — ROUTINE PROCESS
Bedside and Verbal shift change report given to TIM Terrell RN and Dee RN (oncoming nurse) by Gio Meza. Chavez Wang (offgoing nurse). Report included the following information SBAR, Procedure Summary, Intake/Output, MAR, Accordion, Recent Results and Med Rec Status.

## 2022-04-18 NOTE — L&D DELIVERY NOTE
Delivery Summary    Patient: Bridgett Velazquez MRN: [id number]  SSN: [id number]    YOB: 1995  Age: 33 y.o. Sex: female       Terminal bradycardia. RML epis and head delivered with next push. Placenta spontaneous. Information for the patient's :  Devika Vaughn [[id number]]       Labor Events:    Labor: No    Steroids: None   Cervical Ripening Date/Time:       Cervical Ripening Type: None   Antibiotics During Labor: Yes   Rupture Identifier: Sac 1    Rupture Date/Time: 2022 8:21 AM   Rupture Type: AROM   Amniotic Fluid Volume: Scant    Amniotic Fluid Description: Clear    Amniotic Fluid Odor:      Induction: Oxytocin       Induction Date/Time: 2022 6:53 AM    Indications for Induction: Elective    Augmentation: None   Augmentation Date/Time:      Indications for Augmentation:     Labor complications: None       Additional complications:        Delivery Events:  Indications For Episiotomy: Other (See Note)   Episiotomy: Right Mediolateral   Perineal Laceration(s):     Repaired:     Periurethral Laceration Location:      Repaired:     Labial Laceration Location:     Repaired:     Sulcal Laceration Location:     Repaired:     Vaginal Laceration Location:     Repaired:     Cervical Laceration Location:     Repaired:     Repair Suture:     Number of Repair Packets:     Estimated Blood Loss (ml):  ml   Quantitative Blood Loss (ml)                Delivery Date: 2022    Delivery Time: 2:11 PM  Delivery Type: Vaginal, Spontaneous  Sex:  Male    Gestational Age: 44w2d   Delivery Clinician:  Isaak Leblanc  Living Status: Living   Delivery Location: L&D 209          APGARS  One minute Five minutes Ten minutes   Skin color:            Heart rate:            Grimace:            Muscle tone:            Breathing:             Totals:                Presentation: Vertex    Position: Left Occiput Anterior  Resuscitation Method:        Meconium Stained:        Cord Information: 3 Vessels  Complications: None  Cord around:    Delayed cord clamping? Yes  Cord clamped date/time:2022  2:12 PM  Disposition of Cord Blood: Discard    Blood Gases Sent?: No    Placenta:  Date/Time: 2022  2:18 PM  Removal: Expressed      Appearance: Normal      Measurements:  Birth Weight:        Birth Length:        Head Circumference:        Chest Circumference:       Abdominal Girth: Other Providers:   SUNITHA GUAJARDO;ERIC PALMER;CHANA LIMON;SARAN PRATHER, Obstetrician;Primary Nurse;Primary [place] Nurse;Charge Nurse           Group B Strep: No results found for: Lindley Angelucci  Information for the patient's :  Jake Conroy, Devika Rosales [[id number]]   No results found for: ABORH, PCTABR, PCTDIG, BILI, ABORHEXT, ABORH     No results for input(s): PCO2CB, PO2CB, HCO3I, SO2I, IBD, PTEMPI, SPECTI, PHICB, ISITE, IDEV, IALLEN in the last 72 hours.

## 2022-04-18 NOTE — PROGRESS NOTES
4/18/2022  3:18 PM    CM met with FREDI to complete initial assessment and begin discharge planning. MOB verified and confirmed demographics. FREDI lives with Kasey Day ( [phone]), along with her 20mos old, at the address on file. FREDI is employed and plans to take 12wks off from work. FODONOVAN is also employed and will be taking 3wks off. FREDI reports she has good family support, and feels like she has the support she needs when she returns home. FREDI plans to breast and bottle feed baby and has pump to use at home. Dr. Omar Rainey, will provide follow up care for infant. FREDI has car seat, bassinet/crib, clothing, bottles and all necessary supplies for baby. FREDI has [employer], and will be adding baby to this policy. CM discussed process to add baby to insurance, MOB verbalized understanding. FREDI denied needing WIC/Medicaid services. Care Management Interventions  PCP Verified by CM: Yes Jason Sos)  Mode of Transport at Discharge:  Other (see comment)  Transition of Care Consult (CM Consult): Discharge Planning  Support Systems: Other Family Member(s),Spouse/Significant Other  Confirm Follow Up Transport: Family  Discharge Location  Patient Expects to be Discharged to[de-identified] Home with family assistance  Amira Dickinson

## 2022-04-19 VITALS
RESPIRATION RATE: 16 BRPM | WEIGHT: 166 LBS | OXYGEN SATURATION: 99 % | TEMPERATURE: 98.7 F | HEIGHT: 65 IN | DIASTOLIC BLOOD PRESSURE: 63 MMHG | SYSTOLIC BLOOD PRESSURE: 114 MMHG | HEART RATE: 82 BPM | BODY MASS INDEX: 27.66 KG/M2

## 2022-04-19 PROCEDURE — 77030021125

## 2022-04-19 PROCEDURE — 74011250637 HC RX REV CODE- 250/637: Performed by: OBSTETRICS & GYNECOLOGY

## 2022-04-19 PROCEDURE — 2709999900 HC NON-CHARGEABLE SUPPLY

## 2022-04-19 RX ORDER — IBUPROFEN 800 MG/1
800 TABLET ORAL EVERY 8 HOURS
Qty: 60 TABLET | Refills: 0 | Status: SHIPPED | OUTPATIENT
Start: 2022-04-19 | End: 2022-05-27

## 2022-04-19 RX ADMIN — DOCUSATE SODIUM 100 MG: 100 CAPSULE, LIQUID FILLED ORAL at 09:21

## 2022-04-19 RX ADMIN — IBUPROFEN 800 MG: 800 TABLET, FILM COATED ORAL at 02:14

## 2022-04-19 RX ADMIN — MUPIROCIN: 20 OINTMENT TOPICAL at 13:32

## 2022-04-19 RX ADMIN — IBUPROFEN 800 MG: 800 TABLET, FILM COATED ORAL at 13:13

## 2022-04-19 NOTE — PROGRESS NOTES
Post-Partum Day Number 1 Progress Note    [facility] [place]       Information for the patient's :  Chan Nieves, Male Shwetha Arauz [[id number]]   Vaginal, Spontaneous    Patient doing well without significant complaint. Voiding without difficulty, normal lochia. Vitals:  Visit Vitals  BP (!) 94/57 (BP 1 Location: Right upper arm, BP Patient Position: At rest)   Pulse 88   Temp 98.6 °F (37 °C)   Resp 17   Ht 5' 5\" (1.651 m)   Wt 166 lb (75.3 kg)   LMP 2021   SpO2 98%   Breastfeeding Unknown   BMI 27.62 kg/m²     Temp (24hrs), Av.4 °F (36.9 °C), Min:97.8 °F (36.6 °C), Max:98.6 °F (37 °C)        Exam:   Patient without distress. Abdomen soft, fundus firm, nontender                Perineum with normal lochia noted. Lower extremities are negative for swelling, cords or tenderness. Assessment: Doing well, post partum day 1    Plan:  1. Continue routine postpartum and perineal care as well as maternal education.

## 2022-04-19 NOTE — DISCHARGE INSTRUCTIONS
POST DELIVERY DISCHARGE INSTRUCTIONS    Name: Bertrand Rose  YOB: 1995  Primary Diagnosis: Active Problems:    Encounter for elective induction of labor (2022)        General:     Diet/Diet Restrictions:  Eight 8-ounce glasses of fluid daily (water, juices); avoid excessive caffeine intake. Meals/snacks as desired which are high in fiber and carbohydrates and low in fat and cholesterol. Medications:   {Medication reconciliation information is now added to the patient's AVS automatically when it is printed. There is no need to use this SmartLink in discharge instructions. Highlight this text and delete it to clear this message}      Physical Activity / Restrictions / Safety:     Avoid heavy lifting, no more that 8 lbs. For 2-3 weeks; No driving while taking narcotic pain medication. Post  patients should not drive until pain free. No intercourse 4-6 weeks, no douching or tampon use. May resume exercise in 6 weeks. Discharge Instructions/Special Treatment/Home Care Needs:     Continue prenatal vitamins. Continue to use squirt bottle with warm water on your episiotomy after each bathroom use until bleeding stops. If steri-strips applied to your incision, remove in 7 days. Take stool softeners daily. Call your doctor for the following:     Fever over 101 degrees by mouth. Vaginal bleeding heavier than a normal menstrual period or lost larger than a golf ball. Red streaks or increased swelling of legs, painful red streaks on your breast.  Painful urination, or increased pain, redness or discharge with your incision. Pain Management:     Pain Management:   Take Acetaminophen (Tylenol) or Ibuprofen (Advil, Motrin), as directed for pain. Use a warm Sitz bath 3 times daily to relieve episiotomy or hemorrhoidal discomfort. Heating pad to  incision as needed. For hemorrhoidal discomfort, use Tucks and Anusol cream as needed and directed.     Follow-Up Care: Appointment with MD: No orders of the defined types were placed in this encounter.     Telephone number: [phone]    Signed By: Soraya Eddy MD                                                                                                   Date: 4/19/2022 Time: 12:17 PM

## 2022-04-19 NOTE — LACTATION NOTE
This note was copied from a baby's chart. Nurse observed tight frenulum and called physician on call and left a message. Consult for ENT requested and waiting to hear back. Baby is having shorter feeds and patient's nipples are very sore. LC requested to come back for the next breastfeeding session to obtain a deeper latch. Lanolin, hydrogels, and jack crystal cream provided. Breastfeeding booklet given for referral as well. Care for sore/tender nipples discussed:  ways to improve positioning and latch practiced and discussed, hand express colostrum after feedings and let air dry, light application of lanolin, hydrogel pads, seek comfortable laid back feeding position, start feedings on least sore side first.    Reviewed breastfeeding basics:  How milk is made and normal  breastfeeding behaviors discussed. Supply and demand,  stomach size, early feeding cues, skin to skin bonding with comfortable positioning and baby led latch-on reviewed. How to identify signs of successful breastfeeding sessions reviewed; education on assymetrical latch, signs of effective latching vs shallow, in-effective latching, normal  feeding frequency and duration and expected infant output discussed. Normal course of breastfeeding discussed including the AAP's recommendation that children receive exclusive breast milk feedings for the first six months of life with breast milk feedings to continue through the first year of life and/or beyond as complimentary table foods are added. Breastfeeding Booklet and Warm line information provided with discussion. Discussed typical  weight loss and the importance of pediatrician appointment within 24-48 hours of discharge, at 2 weeks of life and normalcy of requesting pediatric weight checks as needed in between visits.     Pt will successfully establish breastfeeding by feeding in response to early feeding cues or wake every 3h, will obtain deep latch, and will keep log of feedings/output. Taught to BF at hunger cues and or q 2-3 hrs and to offer 10-20 drops of hand expressed colostrum at any non-feeds.       Breast Assessment  Left Breast: Medium  Left Nipple: Everted,Intact,Tender  Right Breast: Medium  Right Nipple: Everted,Intact,Tender  Breast- Feeding Assessment  Breast-Feeding Experience: Yes  Breast Trauma/Surgery: No  Type/Quality: [facility]  Lactation Consultant Visits  Breast-Feedings: Fair  Mother/Infant Observation  Mother Observation: Close hold,Breast comfortable,Alignment  Infant Observation: Lips flanged, lower,Lips flanged, upper,Feeding cues,Frenulum checked (tight frenulum)  LATCH Documentation  Latch: Grasps breast, tongue down, lips flanged, rhythmic sucking  Audible Swallowing: A few with stimulation  Type of Nipple: Everted (after stimulation)  Comfort (Breast/Nipple): Filling, red/small blisters/bruises, mild/mod discomfort  Hold (Positioning): No assist from staff, mother able to position/hold infant  LATCH Score: 8

## 2022-04-19 NOTE — ROUTINE PROCESS
Bedside and verbal shift change report given to oncoming nurse, as assigned, by offgoing nurse, Yadira Knapp RN. Report included SBAR, Kardex, I&Os, Recent Results, Procedures, MAR, and changes in patient status. Oncoming nurse and patient given opportunity for questions.

## 2022-04-19 NOTE — ROUTINE PROCESS
Patient discharged to home. Discharge instructions and education completed and patient reported she had no more questions. Bands verified on patient and infant, see footprint sheet. Infant placed in car seat by parent.      Prescriptions: Ibuprofen

## 2022-04-19 NOTE — DISCHARGE SUMMARY
Obstetrical Discharge Summary     Name: Roxane Serra MRN: [id number]  SSN: [id number]    YOB: 1995  Age: 33 y.o. Sex: female      Admit Date: 2022    Discharge Date: 2022     Admitting Physician: Kamini Zamorano MD     Attending Physician:  Narendra Castellon MD     * Admission Diagnoses: Encounter for elective induction of labor [Z34.90]    * Discharge Diagnoses:   Information for the patient's :  Miles Daugherty Male Matthew Briones [[id number]]   Delivery of a 8 lb 2.2 oz (3.69 kg) male infant via Vaginal, Spontaneous on 2022 at 2:11 PM  by Kamini Zamorano. Apgars were 7  and 8 . Additional Diagnoses:   Hospital Problems as of 2022 Date Reviewed: 4/15/2022          Codes Class Noted - Resolved POA    Encounter for elective induction of labor ICD-10-CM: Z34.90  ICD-9-CM: V22.1  2022 - Present Unknown             Lab Results   Component Value Date/Time    ABO/Rh(D) A POSITIVE 2021 01:25 PM    Rubella, External 1.10-Immune 2020 12:00 AM    ABO,Rh A POS 2020 12:00 AM      Immunization History   Administered Date(s) Administered    Influenza Vaccine Easycause) PF (>6 Mo Flulaval, Fluarix, and >3 Yrs [address] [place] [address]) 10/09/2020, 10/13/2021    Tdap 2020, 2022       * Procedures:      * Discharge Condition: stable    * Hospital Course: Normal hospital course following the delivery. * Disposition: home with office follow-up    Discharge Medications:   Current Discharge Medication List          * Follow-up Care/Patient Instructions:   Activity: activity as tolerated  Diet: general  Wound Care: as directed    Follow-up Information     Follow up With Specialties Details Why Audrey Jauregui MD Obstetrics & Gynecology, Gynecology, Obstetrics In 6 weeks  [address]  [phone]             Signed By:  Ninfa Vickers MD     2022

## 2022-05-24 NOTE — PROGRESS NOTES
Postpartum evaluation    Lizzie Hatchet is a 33 y.o. female who presents for a postpartum exam.     She is now six weeks post normal spontaneous vaginal delivery. Her baby is doing well. She has had no menses since delivery. She has had the following significant problems since her delivery: none    The patient is breast feeding without difficulty. The patient would like to use something for birth control, not IUD. She is currently taking: lexapro 10mg 1x daily. Reports good support aqt home. Sleeping ~4hours. She is due for her next AE in 3 months.      Visit Vitals  BP 99/64   Pulse 82   Ht 5' 5\" (1.651 m)   Wt 137 lb 9.6 oz (62.4 kg)   LMP 07/17/2021   Breastfeeding Yes   BMI 22.90 kg/m²       PHYSICAL EXAMINATION    Constitutional  · Appearance: well-nourished, well developed, alert, in no acute distress    HENT  · Head and Face: appears normal    Neck  · Inspection/Palpation: normal appearance, no masses or tenderness  · Lymph Nodes: no lymphadenopathy present  · Thyroid: gland size normal, nontender, no nodules or masses present on palpation    Breasts  · Inspection of Breasts: breasts symmetrical, no skin changes, no discharge present, nipple appearance normal, no skin retraction present  · Palpation of Breasts and Axillae: no masses present on palpation, no breast tenderness  · Axillary Lymph Nodes: no lymphadenopathy present    Gastrointestinal  · Abdominal Examination: abdomen non-tender to palpation, normal bowel sounds, no masses present  · Liver and spleen: no hepatomegaly present, spleen not palpable  · Hernias: no hernias identified    Genitourinary  · External Genitalia: normal appearance for age, no discharge present, no tenderness present, no inflammatory lesions present, no masses present, no atrophy present  · Vagina: normal vaginal vault without central or paravaginal defects, no discharge present, no inflammatory lesions present, no masses present  · Bladder: non-tender to palpation  · Urethra: appears normal  · Cervix: normal   · Uterus: normal size, shape and consistency  · Adnexa: no adnexal tenderness present, no adnexal masses present  · Perineum: perineum within normal limits, no evidence of trauma, no rashes or skin lesions present  · Anus: anus within normal limits, no hemorrhoids present  · Inguinal Lymph Nodes: no lymphadenopathy present    Skin  · General Inspection: no rash, no lesions identified    Neurologic/Psychiatric  · Mental Status:  · Orientation: grossly oriented to person, place and time  · Mood and Affect: mood normal, affect appropriate    Assessment:  Normal postpartum check    Plan:  RTO for AE.   Start Micronor

## 2022-05-27 ENCOUNTER — OFFICE VISIT (OUTPATIENT)
Dept: OBGYN CLINIC | Age: 27
End: 2022-05-27
Payer: COMMERCIAL

## 2022-05-27 VITALS
BODY MASS INDEX: 22.92 KG/M2 | WEIGHT: 137.6 LBS | HEART RATE: 82 BPM | SYSTOLIC BLOOD PRESSURE: 99 MMHG | HEIGHT: 65 IN | DIASTOLIC BLOOD PRESSURE: 64 MMHG

## 2022-05-27 PROCEDURE — 0503F POSTPARTUM CARE VISIT: CPT | Performed by: OBSTETRICS & GYNECOLOGY

## 2022-05-27 RX ORDER — ACETAMINOPHEN AND CODEINE PHOSPHATE 120; 12 MG/5ML; MG/5ML
1 SOLUTION ORAL DAILY
Qty: 3 DOSE PACK | Refills: 1 | Status: SHIPPED | OUTPATIENT
Start: 2022-05-27 | End: 2022-05-27

## 2022-09-09 NOTE — PROGRESS NOTES
Sary Lino is a ,  33 y.o. female [address] [facility] whose Patient's last menstrual period was 2021 (exact date). was on 2021 who presents for her annual checkup. She is having no significant problems. With regard to the Gardisil vaccine, she has received all 3 injections. Menstrual status:    Her periods have not restarted since having a baby on . She denies dysmenorrhea. She reports no premenstrual symptoms. Contraception:    The current method of family planning is OCP (estrogen/progesterone). She would like to discuss other types of birth control. Sexual history:    She  reports being sexually active and has had partner(s) who are male. Medical conditions:    Since her last annual GYN exam about one year ago, she has not the following changes in her health history: none. Pap and Mammogram History:    Her most recent Pap smear was on 2020 and was negative/normal    The patient has never had a mammogram.    The patient does not have a family history of breast cancer. Past Medical History:   Diagnosis Date    Anemia     Herpes simplex virus (HSV) infection     IUD (intrauterine device) in place 01/15/2021    Mirena placed    Migraines     Postpartum anxiety     Psychiatric problem     anxiety (lexapro)     No past surgical history on file. Current Outpatient Medications   Medication Sig Dispense Refill    prenatal vit-iron fumarate-fa 27 mg iron- 0.8 mg tab tablet Take 1 Tablet by mouth daily. 90 Tablet 4    butalbital-acetaminophen-caffeine (FIORICET, ESGIC) -40 mg per tablet Take 1 Tab by mouth every four (4) hours as needed for Headache.  (Patient not taking: Reported on 2021) 30 Tab 0     Allergies: Zyrtec [cetirizine]   Social History     Socioeconomic History    Marital status:      Spouse name: Not on file    Number of children: Not on file    Years of education: Not on file    Highest education level: Not on file Occupational History    Not on file   Tobacco Use    Smoking status: Never    Smokeless tobacco: Never   Substance and Sexual Activity    Alcohol use: Not Currently    Drug use: Never    Sexual activity: Yes     Partners: Male   Other Topics Concern     Service Not Asked    Blood Transfusions Not Asked    Caffeine Concern Not Asked    Occupational Exposure Not Asked    Hobby Hazards Not Asked    Sleep Concern Not Asked    Stress Concern Not Asked    Weight Concern Not Asked    Special Diet Not Asked    Back Care Not Asked    Exercise Not Asked    Bike Helmet Not Asked    Seat Belt Not Asked    Self-Exams Not Asked   Social History Narrative    Not on file     Social Determinants of Health     Financial Resource Strain: Not on file   Food Insecurity: Not on file   Transportation Needs: Not on file   Physical Activity: Not on file   Stress: Not on file   Social Connections: Not on file   Intimate Partner Violence: Not on file   Housing Stability: Not on file     Tobacco History:  reports that she has never smoked. She has never used smokeless tobacco.  Alcohol Abuse:  reports that she does not currently use alcohol. Drug Abuse:  reports no history of drug use.     Patient Active Problem List   Diagnosis Code    Supervision of other normal pregnancy, antepartum Z34.80    Encounter for elective induction of labor Z34.90       Review of Systems - History obtained from the patient  Constitutional: negative for weight loss, fever, night sweats  HEENT: negative for hearing loss, earache, congestion, snoring, sorethroat  CV: negative for chest pain, palpitations, edema  Resp: negative for cough, shortness of breath, wheezing  GI: negative for change in bowel habits, abdominal pain, black or bloody stools  : negative for frequency, dysuria, hematuria, vaginal discharge  MSK: negative for back pain, joint pain, muscle pain  Breast: negative for breast lumps, nipple discharge, galactorrhea  Skin :negative for itching, rash, hives  Neuro: negative for dizziness, headache, confusion, weakness  Psych: negative for anxiety, depression, change in mood  Heme/lymph: negative for bleeding, bruising, pallor    Physical Exam    Visit Vitals  /66   Pulse 92   Wt 125 lb 6.4 oz (56.9 kg)   LMP 07/17/2021 (Exact Date)   Breastfeeding Yes   BMI 20.87 kg/m²       Constitutional  Appearance: well-nourished, well developed, alert, in no acute distress    HENT  Head and Face: appears normal    Neck  Inspection/Palpation: normal appearance, no masses or tenderness  Lymph Nodes: no lymphadenopathy present  Thyroid: gland size normal, nontender, no nodules or masses present on palpation    Chest  Respiratory Effort: breathing normal  Auscultation: normal breath sounds    Cardiovascular  Heart:   Auscultation: regular rate and rhythm without murmur    Breasts  Inspection of Breasts: breasts symmetrical, no skin changes, no discharge present, nipple appearance normal, no skin retraction present  Palpation of Breasts and Axillae: no masses present on palpation, no breast tenderness  Axillary Lymph Nodes: no lymphadenopathy present    Gastrointestinal  Abdominal Examination: abdomen non-tender to palpation, normal bowel sounds, no masses present  Liver and spleen: no hepatomegaly present, spleen not palpable  Hernias: no hernias identified    Genitourinary  External Genitalia: normal appearance for age, no discharge present, no tenderness present, no inflammatory lesions present, no masses present, no atrophy present  Vagina: normal vaginal vault without central or paravaginal defects, no discharge present, no inflammatory lesions present, no masses present  Bladder: non-tender to palpation  Urethra: appears normal  Cervix: normal   Uterus: normal size, shape and consistency  Adnexa: no adnexal tenderness present, no adnexal masses present  Perineum: perineum within normal limits, no evidence of trauma, no rashes or skin lesions present  Anus: anus within normal limits, no hemorrhoids present  Inguinal Lymph Nodes: no lymphadenopathy present    Skin  General Inspection: no rash, no lesions identified    Neurologic/Psychiatric  Mental Status:  Orientation: grossly oriented to person, place and time  Mood and Affect: mood normal, affect appropriate    . Assessment:  Routine gynecologic examination  Her current medical status is satisfactory with no evidence of significant gynecologic issues.     Plan:  Counseled re: diet, exercise, healthy lifestyle  Return for yearly wellness visits  Return to place Mirena - BF/Micronor  adan not to be on menses

## 2022-09-12 ENCOUNTER — OFFICE VISIT (OUTPATIENT)
Dept: OBGYN CLINIC | Age: 27
End: 2022-09-12
Payer: COMMERCIAL

## 2022-09-12 VITALS
DIASTOLIC BLOOD PRESSURE: 66 MMHG | SYSTOLIC BLOOD PRESSURE: 110 MMHG | BODY MASS INDEX: 20.87 KG/M2 | HEART RATE: 92 BPM | WEIGHT: 125.4 LBS

## 2022-09-12 DIAGNOSIS — Z01.419 ENCOUNTER FOR GYNECOLOGICAL EXAMINATION (GENERAL) (ROUTINE) WITHOUT ABNORMAL FINDINGS: Primary | ICD-10-CM

## 2022-09-12 PROCEDURE — 99395 PREV VISIT EST AGE 18-39: CPT | Performed by: OBSTETRICS & GYNECOLOGY

## 2022-09-26 ENCOUNTER — OFFICE VISIT (OUTPATIENT)
Dept: OBGYN CLINIC | Age: 27
End: 2022-09-26
Payer: COMMERCIAL

## 2022-09-26 VITALS
BODY MASS INDEX: 20.76 KG/M2 | WEIGHT: 124.6 LBS | HEIGHT: 65 IN | DIASTOLIC BLOOD PRESSURE: 60 MMHG | SYSTOLIC BLOOD PRESSURE: 98 MMHG

## 2022-09-26 DIAGNOSIS — N94.89 SUPPRESSION OF MENSES: ICD-10-CM

## 2022-09-26 DIAGNOSIS — Z30.430 ENCOUNTER FOR INSERTION OF INTRAUTERINE CONTRACEPTIVE DEVICE: ICD-10-CM

## 2022-09-26 DIAGNOSIS — Z30.432 ENCOUNTER FOR IUD REMOVAL: Primary | ICD-10-CM

## 2022-09-26 LAB
HCG URINE, QL. (POC): NEGATIVE
VALID INTERNAL CONTROL?: YES

## 2022-09-26 PROCEDURE — 58301 REMOVE INTRAUTERINE DEVICE: CPT | Performed by: OBSTETRICS & GYNECOLOGY

## 2022-09-26 PROCEDURE — 81025 URINE PREGNANCY TEST: CPT | Performed by: OBSTETRICS & GYNECOLOGY

## 2022-09-26 PROCEDURE — 58300 INSERT INTRAUTERINE DEVICE: CPT | Performed by: OBSTETRICS & GYNECOLOGY

## 2022-09-26 NOTE — PROGRESS NOTES
ED Nurse Note:

no acute distress. Sitter at bedside. Safety precautions in place. Pt is 
talking very loudly ans states he hears voices. VERONICA ARVIZU [facility]  OFFICE PROCEDURE PROGRESS NOTE        Chart reviewed for the following:   Frank RAMIREZ RN, have reviewed the History, Physical and updated the Allergic reactions for Yovany performed immediately prior to start of procedure:   Frank RAMIREZ RN, have performed the following reviews on Carolee Flores prior to the start of the procedure:            * Patient was identified by name and date of birth   * Agreement on procedure being performed was verified  * Risks and Benefits explained to the patient  * Procedure site verified and marked as necessary  * Patient was positioned for comfort  * Consent was signed and verified     Time: 2:13 PM        Date of procedure: 2022    Procedure performed by:  Tressa Lyn MD      Patient assisted by: self    How tolerated by patient: tolerated the procedure well with no complications    Post Procedural Pain Scale: 0 - No Hurt    Comments: none      Mirena IUD INSERTION  Indications:  Carolee Flores is a ,  33 y.o. female [address] [place] [address] 9 LMP 21. Her LMP was normal in duration and amount of flow. She  presents for insertion of an IUD. The risks, benefits and alternatives of IUD insertion were discussed in detail at last visit. She also has reviewed Mirena information. She has elected to proceed with the insertion today and she states she has no further questions. A urine pregnancy test was negative   Procedure: The pelvic exam revealed normal external genitalia. On bimanual exam the uterus was retroverted and normal in size with no tenderness present. A speculum was inserted into the vagina and the cervix was visualized. The cervix was prepped with zephiran solution. The anterior lip of the cervix was grasped with a single toothed tenaculum. The uterus was sounded with a Warren sound to 6 centimeters. A Mirena was then inserted without difficulty. The string was cut to 3 centimeters. She experienced a mild  amount of cramping. Post Procedure Status:   She tolerated the procedure with moderate discomfort. The patient was observed for 60 minutes after the insertion. She remained vagal. IUD was then removed. Patient was discharged in stable condition. The patient received Mirena lot number [id number]. IUD REMOVAL  Indications for Removal:  Chris Srivastava is a ,  33 y.o. female WHITE/NON- whose Patient's last menstrual period was 2021 (exact date). 2021. who presents today for IUD removal. Her current IUD was placed today. She became vagal after placeement with the IUD. She requests removal of the IUD because of cramping and vagal. The IUD removal procedure was discussed with the patient and she had no further questions. Procedure: The patient was placed in a dorsal lithotomy position and appropriately draped. On bimanual exam the uterus was anterior and normal in size with no tenderness present. A speculum exam was performed and the cervix was visualized. The cervix was prepped with zephiran solution. The IUD string was visualized. Using ring forceps , the string was grasped and the IUD removed intact. The IUD was shown to the patient.    Plan replace IUD under US guidance next month

## 2022-09-26 NOTE — PROGRESS NOTES
After becoming vagal post procedure,pt laidon table x 30 plus minutes,had 2 cups of water,sat up,ate her chicken sandwich and stated she felt much better. Left without incident at this time.

## 2022-10-03 ENCOUNTER — TELEPHONE (OUTPATIENT)
Dept: OBGYN CLINIC | Age: 27
End: 2022-10-03

## 2022-10-03 RX ORDER — B-COMPLEX WITH VITAMIN C
TABLET ORAL
Qty: 90 TABLET | Refills: 4 | Status: SHIPPED | OUTPATIENT
Start: 2022-10-03

## 2022-10-03 NOTE — TELEPHONE ENCOUNTER
33year old patient last seen in the office for ae on 9/12/2022    ? ok to refill has pended from the pharmacy    Please amend/sign    Thank you

## 2022-11-09 PROBLEM — Z34.80 SUPERVISION OF OTHER NORMAL PREGNANCY, ANTEPARTUM: Status: RESOLVED | Noted: 2021-09-16 | Resolved: 2022-11-09

## 2022-11-09 PROBLEM — Z34.90 ENCOUNTER FOR ELECTIVE INDUCTION OF LABOR: Status: RESOLVED | Noted: 2022-04-18 | Resolved: 2022-11-09

## 2022-11-09 RX ORDER — ACETAMINOPHEN AND CODEINE PHOSPHATE 120; 12 MG/5ML; MG/5ML
1 SOLUTION ORAL DAILY
Qty: 3 DOSE PACK | Refills: 1 | Status: SHIPPED | OUTPATIENT
Start: 2022-11-09

## 2023-04-29 RX ORDER — NORETHINDRONE ACETATE AND ETHINYL ESTRADIOL 1MG-20(24)
1 KIT ORAL DAILY
Qty: 3 DOSE PACK | Refills: 1 | Status: SHIPPED | OUTPATIENT
Start: 2023-04-29

## 2023-05-05 RX ORDER — ESCITALOPRAM OXALATE 10 MG/1
10 TABLET ORAL DAILY
Qty: 90 TABLET | Refills: 4 | Status: SHIPPED | OUTPATIENT
Start: 2023-05-05

## 2023-05-24 RX ORDER — BUTALBITAL, ACETAMINOPHEN AND CAFFEINE 50; 325; 40 MG/1; MG/1; MG/1
1 TABLET ORAL EVERY 4 HOURS PRN
COMMUNITY
Start: 2020-11-19

## 2023-05-24 RX ORDER — ESCITALOPRAM OXALATE 10 MG/1
10 TABLET ORAL DAILY
COMMUNITY
Start: 2023-05-05

## 2023-05-24 RX ORDER — ACETAMINOPHEN AND CODEINE PHOSPHATE 120; 12 MG/5ML; MG/5ML
0.35 SOLUTION ORAL DAILY
COMMUNITY
Start: 2022-11-09

## 2023-05-24 RX ORDER — NORETHINDRONE ACETATE AND ETHINYL ESTRADIOL 1MG-20(24)
1 KIT ORAL DAILY
COMMUNITY
Start: 2023-04-29

## 2023-06-01 RX ORDER — ESCITALOPRAM OXALATE 10 MG/1
TABLET ORAL
Qty: 30 TABLET | Refills: 14 | OUTPATIENT
Start: 2023-06-01

## 2023-07-27 RX ORDER — ACETAMINOPHEN AND CODEINE PHOSPHATE 120; 12 MG/5ML; MG/5ML
SOLUTION ORAL
Qty: 28 TABLET | Refills: 5 | OUTPATIENT
Start: 2023-07-27

## 2023-08-30 RX ORDER — ESCITALOPRAM OXALATE 10 MG/1
TABLET ORAL
Qty: 30 TABLET | Refills: 14 | OUTPATIENT
Start: 2023-08-30

## 2023-09-29 ENCOUNTER — OFFICE VISIT (OUTPATIENT)
Age: 28
End: 2023-09-29
Payer: COMMERCIAL

## 2023-09-29 VITALS — WEIGHT: 138.2 LBS | DIASTOLIC BLOOD PRESSURE: 72 MMHG | BODY MASS INDEX: 23 KG/M2 | SYSTOLIC BLOOD PRESSURE: 112 MMHG

## 2023-09-29 DIAGNOSIS — Z01.419 ENCNTR FOR GYN EXAM (GENERAL) (ROUTINE) W/O ABN FINDINGS: Primary | ICD-10-CM

## 2023-09-29 DIAGNOSIS — N89.8 VAGINAL DISCHARGE: ICD-10-CM

## 2023-09-29 PROCEDURE — 99395 PREV VISIT EST AGE 18-39: CPT | Performed by: OBSTETRICS & GYNECOLOGY

## 2023-09-29 NOTE — PROGRESS NOTES
Miriam Schmid is a 33 y.o. female returns for an annual exam     Chief Complaint   Patient presents with    Annual Exam       Patient's last menstrual period was 09/20/2023. Her periods are light, moderate in flow and usually regular with a 26-32 day interval with 3-7 day duration. She does not have dysmenorrhea. Problems: vaginal discharge, wants to discuss lexapro  Birth Control: vasectomy  Last Pap: 5/26/2020 neg  She does not have a history of CANDACE 2, 3 or cervical cancer.    With regard to the Gardisil vaccine, she has received all 3 injections      Examination chaperoned by Doris Orta MA.
vaginal vault without central or paravaginal defects, no discharge present, no inflammatory lesions present, no masses present  Bladder: non-tender to palpation  Urethra: appears normal  Cervix: normal   Uterus: normal size, shape and consistency  Adnexa: no adnexal tenderness present, no adnexal masses present  Perineum: perineum within normal limits, no evidence of trauma, no rashes or skin lesions present  Anus: anus within normal limits, no hemorrhoids present  Inguinal Lymph Nodes: no lymphadenopathy present    Skin  General Inspection: no rash, no lesions identified    Neurologic/Psychiatric  Mental Status:  Orientation: grossly oriented to person, place and time  Mood and Affect: mood normal, affect appropriate    . Assessment:  Routine gynecologic examination  Her current medical status is satisfactory with no evidence of significant gynecologic issues.   Vaginal discharge  Plan:  Counseled re: diet, exercise, healthy lifestyle  Return for yearly wellness visits  Pap  Nuswab  Will try to wean the lexapro

## 2023-10-02 LAB
A VAGINAE DNA VAG QL NAA+PROBE: NORMAL SCORE
BVAB2 DNA VAG QL NAA+PROBE: NORMAL SCORE
C ALBICANS DNA VAG QL NAA+PROBE: NEGATIVE
C GLABRATA DNA VAG QL NAA+PROBE: NEGATIVE
MEGA1 DNA VAG QL NAA+PROBE: NORMAL SCORE
T VAGINALIS DNA VAG QL NAA+PROBE: NEGATIVE

## 2023-10-04 LAB
., LABCORP: NORMAL
CYTOLOGIST CVX/VAG CYTO: NORMAL
CYTOLOGY CVX/VAG DOC CYTO: NORMAL
CYTOLOGY CVX/VAG DOC THIN PREP: NORMAL
DX ICD CODE: NORMAL
Lab: NORMAL
OTHER STN SPEC: NORMAL
STAT OF ADQ CVX/VAG CYTO-IMP: NORMAL

## 2024-05-20 ENCOUNTER — TELEPHONE (OUTPATIENT)
Age: 29
End: 2024-05-20

## 2024-05-20 RX ORDER — ESCITALOPRAM OXALATE 10 MG/1
10 TABLET ORAL DAILY
Qty: 90 TABLET | Refills: 1 | Status: SHIPPED | OUTPATIENT
Start: 2024-05-20

## 2024-05-20 RX ORDER — ESCITALOPRAM OXALATE 10 MG/1
10 TABLET ORAL DAILY
Qty: 30 TABLET | Refills: 14 | OUTPATIENT
Start: 2024-05-20

## 2024-05-20 NOTE — TELEPHONE ENCOUNTER
PT calling back, name and  verified    PT calling back stating she received a message from her pharmacy stating her Rx was not approved and if it is something she needs to call the pharmacy for?  Advised PT to call pharmacy per her phone encounter previously RN sent Rx:   Disp Refills Start End    escitalopram (LEXAPRO) 10 MG tablet 90 tablet 1 2024 --    Sig - Route: Take 1 tablet by mouth daily - Oral    Sent to pharmacy as: Escitalopram Oxalate 10 MG Oral Tablet (LEXAPRO)    Cosign for Ordering: Required by Jenn Abrams MD    E-Prescribing Status: Receipt confirmed by pharmacy (2024  9:02 AM EDT)          PT verbalizes understanding.

## 2024-05-20 NOTE — TELEPHONE ENCOUNTER
Two patient identifiers used    29 year old patient last seen in the office 9/29/2023 and has next ae on 9/30/2024    Patient  calling to ask for refill of her    Disp Refills Start End    escitalopram (LEXAPRO) 10 MG tablet            Prescription sent as per MD verbal order to get patient to her scheduled appointment    Patient advised of need to keep appointment in order to get patient to her scheduled appointment    Patient verbalized understanding

## 2024-09-10 NOTE — PROGRESS NOTES
Kandy King is a 29 y.o. female returns for an annual exam     Chief Complaint   Patient presents with    Annual Exam     Patient's last menstrual period was 09/19/2024 (exact date).  Her periods are moderate in flow and usually regular with a 26-32 day interval with 3-7 day duration.  She does not have dysmenorrhea.  Problems: no problems  Birth Control: vasectomy.  Last Pap: Normal, obtained 1 year(s) ago 9/29/23  She does not have a history of CANDACE 2, 3 or cervical cancer.   With regard to the Gardisil vaccine, she has received all 3 injections    1. Have you been to the ER, urgent care clinic, or hospitalized since your last visit? No    2. Have you seen or consulted any other health care providers outside of the [facility] since your last visit? No    Examination chaperoned by Yesica Carranza LPN.

## 2024-09-30 ENCOUNTER — OFFICE VISIT (OUTPATIENT)
Age: 29
End: 2024-09-30
Payer: COMMERCIAL

## 2024-09-30 VITALS
SYSTOLIC BLOOD PRESSURE: 99 MMHG | HEIGHT: 65 IN | DIASTOLIC BLOOD PRESSURE: 67 MMHG | WEIGHT: 135.8 LBS | BODY MASS INDEX: 22.63 KG/M2 | HEART RATE: 114 BPM

## 2024-09-30 DIAGNOSIS — Z01.419 ENCOUNTER FOR GYNECOLOGICAL EXAMINATION (GENERAL) (ROUTINE) WITHOUT ABNORMAL FINDINGS: Primary | ICD-10-CM

## 2024-09-30 PROCEDURE — 99395 PREV VISIT EST AGE 18-39: CPT | Performed by: OBSTETRICS & GYNECOLOGY

## 2024-09-30 PROCEDURE — 99459 PELVIC EXAMINATION: CPT | Performed by: OBSTETRICS & GYNECOLOGY

## 2024-09-30 SDOH — ECONOMIC STABILITY: INCOME INSECURITY: HOW HARD IS IT FOR YOU TO PAY FOR THE VERY BASICS LIKE FOOD, HOUSING, MEDICAL CARE, AND HEATING?: NOT VERY HARD

## 2024-09-30 SDOH — ECONOMIC STABILITY: FOOD INSECURITY: WITHIN THE PAST 12 MONTHS, YOU WORRIED THAT YOUR FOOD WOULD RUN OUT BEFORE YOU GOT MONEY TO BUY MORE.: NEVER TRUE

## 2024-09-30 SDOH — ECONOMIC STABILITY: FOOD INSECURITY: WITHIN THE PAST 12 MONTHS, THE FOOD YOU BOUGHT JUST DIDN'T LAST AND YOU DIDN'T HAVE MONEY TO GET MORE.: NEVER TRUE

## 2024-09-30 SDOH — ECONOMIC STABILITY: TRANSPORTATION INSECURITY
IN THE PAST 12 MONTHS, HAS LACK OF TRANSPORTATION KEPT YOU FROM MEETINGS, WORK, OR FROM GETTING THINGS NEEDED FOR DAILY LIVING?: NO

## 2024-09-30 ASSESSMENT — PATIENT HEALTH QUESTIONNAIRE - PHQ9
SUM OF ALL RESPONSES TO PHQ9 QUESTIONS 1 & 2: 1
1. LITTLE INTEREST OR PLEASURE IN DOING THINGS: SEVERAL DAYS
SUM OF ALL RESPONSES TO PHQ QUESTIONS 1-9: 1
2. FEELING DOWN, DEPRESSED OR HOPELESS: NOT AT ALL
SUM OF ALL RESPONSES TO PHQ QUESTIONS 1-9: 1

## 2024-09-30 NOTE — PROGRESS NOTES
Kandy King is a ,  29 y.o. female White (non-) whose Patient's last menstrual period was 2024 (exact date).  was on 2024 who presents for her annual checkup. She is having no problems.      Menstrual status:    Her periods are moderate in flow, regular    She denies dysmenorrhea.        Contraception:    The current method of family planning is vasectomy    Sexual history:    She  reports being sexually active and has had partner(s) who are male. She reports using the following method of birth control/protection: Surgical.      Pap and Mammogram History:    Patient's last menstrual period was 2024 (exact date).  Her periods are moderate in flow and usually regular with a 26-32 day interval with 3-7 day duration.  She does not have dysmenorrhea.  Problems: no problems  Birth Control: vasectomy.  Last Pap: Normal, obtained 1 year(s) ago 23  She does not have a history of CANDACE 2, 3 or cervical cancer.   With regard to the Gardisil vaccine, she has received all 3 injections    The patient does not have a family history of breast cancer.  Family History   Problem Relation Age of Onset    Asthma Brother        Past Medical History:   Diagnosis Date    Anemia     Herpes simplex virus (HSV) infection     IUD (intrauterine device) in place 01/15/2021    Mirena placed    Migraine     Migraines     Postpartum anxiety     Psychiatric problem     anxiety (lexapro)     Past Surgical History:   Procedure Laterality Date    BREAST ENHANCEMENT SURGERY  2024       Current Outpatient Medications   Medication Sig Dispense Refill    escitalopram (LEXAPRO) 10 MG tablet Take 1 tablet by mouth daily 90 tablet 1    butalbital-acetaminophen-caffeine (FIORICET, ESGIC) -40 MG per tablet Take 1 tablet by mouth every 4 hours as needed       No current facility-administered medications for this visit.     Allergies: Cetirizine   Tobacco History:  reports that she has never smoked. She has

## 2024-11-15 ENCOUNTER — TELEPHONE (OUTPATIENT)
Age: 29
End: 2024-11-15

## 2024-11-15 ENCOUNTER — PATIENT MESSAGE (OUTPATIENT)
Age: 29
End: 2024-11-15

## 2024-11-15 RX ORDER — ESCITALOPRAM OXALATE 5 MG/1
5 TABLET ORAL DAILY
Qty: 30 TABLET | Refills: 5 | Status: SHIPPED | OUTPATIENT
Start: 2024-11-15

## 2024-11-15 RX ORDER — ESCITALOPRAM OXALATE 5 MG/1
5 TABLET ORAL DAILY
Qty: 90 TABLET | Refills: 1 | OUTPATIENT
Start: 2024-11-15

## 2024-11-15 NOTE — TELEPHONE ENCOUNTER
PT was called by RN, name and  verified    RN reviewed that a [facility] message was sent to clarify if PT had attempted to wean, there was no note of it. PT states she did attempt to wean the Lexapro and it did not work out. PT states the dose she is on now \"she is happy\" with.    Rx pended for MD to review, her ae was 24, and the pharmacy is requesting 30 days with 5 refills, which would not get her to her next due ae 2025.    Please review  Thank you

## 2024-11-15 NOTE — TELEPHONE ENCOUNTER
28 yo last ov/ae 9/30/24    Rx was last sent   escitalopram (LEXAPRO) 10 MG tablet 90 tablet 1 5/20/2024       Per 9/29/23 ae for the Rx plan:  Plan:   Will try to wean the lexapro     PT requested and called for a refill on this Rx 5/19 and 5/20/24 and the refill was sent 90 tabs 1 refill.  There is no note if PT has tried to wean.    PT sent a [facility] message to clarify, has she weaned? Not noted in her most recent ae note.

## 2024-11-25 RX ORDER — ESCITALOPRAM OXALATE 10 MG/1
10 TABLET ORAL DAILY
Qty: 90 TABLET | Refills: 4 | Status: SHIPPED | OUTPATIENT
Start: 2024-11-25

## 2024-12-09 RX ORDER — ESCITALOPRAM OXALATE 5 MG/1
5 TABLET ORAL DAILY
Qty: 90 TABLET | Refills: 2 | OUTPATIENT
Start: 2024-12-09